# Patient Record
Sex: MALE | Race: WHITE | HISPANIC OR LATINO | URBAN - METROPOLITAN AREA
[De-identification: names, ages, dates, MRNs, and addresses within clinical notes are randomized per-mention and may not be internally consistent; named-entity substitution may affect disease eponyms.]

---

## 2020-07-29 ENCOUNTER — VIRTUAL VISIT (OUTPATIENT)
Dept: INTERNAL MEDICINE | Facility: CLINIC | Age: 35
End: 2020-07-29
Payer: COMMERCIAL

## 2020-07-29 DIAGNOSIS — R52 PAIN: Primary | ICD-10-CM

## 2020-07-29 DIAGNOSIS — Z13.220 LIPID SCREENING: ICD-10-CM

## 2020-07-29 RX ORDER — OMEPRAZOLE 10 MG/1
CAPSULE, DELAYED RELEASE ORAL
COMMUNITY
Start: 2020-04-24

## 2020-07-29 RX ORDER — IBUPROFEN 600 MG/1
TABLET, FILM COATED ORAL
COMMUNITY
Start: 2020-05-08

## 2020-07-29 ASSESSMENT — PAIN SCALES - GENERAL: PAINLEVEL: NO PAIN (0)

## 2020-07-29 NOTE — PROGRESS NOTES
Telephone visit    Mr. Benavides agrees to a telephone visit    H/o spinal cord injury 2012 L1 from falling from ladder. Care every note from 4/24/2020 regarding additional details. Overall stable. He has a bowel program and uses mini enemas every other day and this works for him. He has urinary catheters for neurogenic bladder and has not had UTI for more than 6 years. He has some minor skin back breakdown after an accident about 2 years ago. He tries to exercise. He sates Td within the last 10 years. He remains on Gabapentin 600 mg PO BID, rare Percocet #30 that last him 1-3 months. He takes a MVI. He takes PRN Ibuprofen and a PPI. No early family h/o prostate or colon cancer. He is due for basic lab tests. He would like to meet with PMR. No other HEENT, cardiopulmonary, abdominal, , neurological, systemic, psychiatric, lymphatic, endocrine, vascular complaints. Will set up PMR and try to see him on the same day. Ordered labs today    JACQUELINE Baird MD    Total time today 12 minutes and 56 seconds.

## 2020-07-29 NOTE — Clinical Note
(1) future labs  (2) future PMR referral  (3) If he comes in-person for PMR I would like to see him in-person on the same day. Otherwise I would like to see him in-person for one hour in the next 3 weeks

## 2020-08-19 ENCOUNTER — TELEPHONE (OUTPATIENT)
Dept: INTERNAL MEDICINE | Facility: CLINIC | Age: 35
End: 2020-08-19

## 2020-08-19 ENCOUNTER — OFFICE VISIT (OUTPATIENT)
Dept: INTERNAL MEDICINE | Facility: CLINIC | Age: 35
End: 2020-08-19
Payer: COMMERCIAL

## 2020-08-19 VITALS — DIASTOLIC BLOOD PRESSURE: 76 MMHG | OXYGEN SATURATION: 95 % | SYSTOLIC BLOOD PRESSURE: 130 MMHG | HEART RATE: 62 BPM

## 2020-08-19 DIAGNOSIS — Z13.220 LIPID SCREENING: ICD-10-CM

## 2020-08-19 DIAGNOSIS — R52 PAIN: ICD-10-CM

## 2020-08-19 LAB
ALBUMIN SERPL-MCNC: 4.1 G/DL (ref 3.4–5)
ALP SERPL-CCNC: 60 U/L (ref 40–150)
ALT SERPL W P-5'-P-CCNC: 21 U/L (ref 0–70)
ANION GAP SERPL CALCULATED.3IONS-SCNC: 5 MMOL/L (ref 3–14)
AST SERPL W P-5'-P-CCNC: 16 U/L (ref 0–45)
BASOPHILS # BLD AUTO: 0.1 10E9/L (ref 0–0.2)
BASOPHILS NFR BLD AUTO: 0.8 %
BILIRUB SERPL-MCNC: 0.4 MG/DL (ref 0.2–1.3)
BUN SERPL-MCNC: 20 MG/DL (ref 7–30)
CALCIUM SERPL-MCNC: 8.8 MG/DL (ref 8.5–10.1)
CHLORIDE SERPL-SCNC: 104 MMOL/L (ref 94–109)
CHOLEST SERPL-MCNC: 180 MG/DL
CO2 SERPL-SCNC: 30 MMOL/L (ref 20–32)
CREAT SERPL-MCNC: 0.87 MG/DL (ref 0.66–1.25)
DIFFERENTIAL METHOD BLD: NORMAL
EOSINOPHIL # BLD AUTO: 0.1 10E9/L (ref 0–0.7)
EOSINOPHIL NFR BLD AUTO: 1.8 %
ERYTHROCYTE [DISTWIDTH] IN BLOOD BY AUTOMATED COUNT: 12.1 % (ref 10–15)
GFR SERPL CREATININE-BSD FRML MDRD: >90 ML/MIN/{1.73_M2}
GLUCOSE SERPL-MCNC: 82 MG/DL (ref 70–99)
HCT VFR BLD AUTO: 42.4 % (ref 40–53)
HDLC SERPL-MCNC: 46 MG/DL
HGB BLD-MCNC: 14.5 G/DL (ref 13.3–17.7)
IMM GRANULOCYTES # BLD: 0 10E9/L (ref 0–0.4)
IMM GRANULOCYTES NFR BLD: 0.3 %
LDLC SERPL CALC-MCNC: 89 MG/DL
LYMPHOCYTES # BLD AUTO: 2.1 10E9/L (ref 0.8–5.3)
LYMPHOCYTES NFR BLD AUTO: 34.9 %
MCH RBC QN AUTO: 31.8 PG (ref 26.5–33)
MCHC RBC AUTO-ENTMCNC: 34.2 G/DL (ref 31.5–36.5)
MCV RBC AUTO: 93 FL (ref 78–100)
MONOCYTES # BLD AUTO: 0.5 10E9/L (ref 0–1.3)
MONOCYTES NFR BLD AUTO: 8.6 %
NEUTROPHILS # BLD AUTO: 3.3 10E9/L (ref 1.6–8.3)
NEUTROPHILS NFR BLD AUTO: 53.6 %
NONHDLC SERPL-MCNC: 134 MG/DL
NRBC # BLD AUTO: 0 10*3/UL
NRBC BLD AUTO-RTO: 0 /100
PLATELET # BLD AUTO: 358 10E9/L (ref 150–450)
POTASSIUM SERPL-SCNC: 3.8 MMOL/L (ref 3.4–5.3)
PROT SERPL-MCNC: 7.6 G/DL (ref 6.8–8.8)
RBC # BLD AUTO: 4.56 10E12/L (ref 4.4–5.9)
SODIUM SERPL-SCNC: 139 MMOL/L (ref 133–144)
TRIGL SERPL-MCNC: 226 MG/DL
WBC # BLD AUTO: 6.1 10E9/L (ref 4–11)

## 2020-08-19 RX ORDER — OXYCODONE AND ACETAMINOPHEN 5; 325 MG/1; MG/1
1 TABLET ORAL
Qty: 30 TABLET | Refills: 0 | Status: SHIPPED | OUTPATIENT
Start: 2020-08-19 | End: 2020-12-08

## 2020-08-19 ASSESSMENT — PAIN SCALES - GENERAL: PAINLEVEL: NO PAIN (0)

## 2020-08-19 NOTE — NURSING NOTE
Chief Complaint   Patient presents with     Recheck Medication     pt here tp follow up from a few weeks ago       Matt Hernandez CMA, EMT at 10:01 AM on 8/19/2020.

## 2020-08-19 NOTE — TELEPHONE ENCOUNTER
Prior Authorization Approval        Authorization Effective Date: 7/20/2020  Authorization Expiration Date: 8/19/2021  Medication: docusate sodium (ENEMEEZ) 283 MG enema-PA APPROVED   Approved Dose/Quantity:   Reference #: CASE [id number]   Insurance Company: [employer] - Phone [phone] Fax [phone]  Expected CoPay:       CoPay Card Available:      Foundation Assistance Needed:    Which Pharmacy is filling the prescription (Not needed for infusion/clinic administered): [facility] - [place], [place] - [address]  Pharmacy Notified: Yes- **Instructed pharmacy to notify patient when script is ready to /ship.**  Patient Notified: Yes

## 2020-08-19 NOTE — TELEPHONE ENCOUNTER
Central Prior Authorization Team   [phone]    PA Initiation    Medication: docusate sodium (ENEMEEZ) 283 MG enema   Insurance Company: [employer] - Phone [phone] Fax [phone]  Pharmacy Filling the Rx: [facility] - [place], [place] - [address]  Filling Pharmacy Phone: [phone]  Filling Pharmacy Fax: [phone]  Start Date: 8/19/2020

## 2020-08-19 NOTE — PROGRESS NOTES
"HPI:    See telephone note from 7/29/2020 regarding his medical history. H/o spinal cord injury 2012 L1 from falling from ladder. Care every note from 4/24/2020 regarding additional details. He states he was at his Grandmother's house to do some cleaning on her roof and the latter slipped out and he landed on the driveway on his back. He states spinal surgery at L1. He denies any urinary complaints and self-caths. He uses \"mini enemas\" for bowel program. He uses PRN Ibuprofen/Alleve. He takes Gabapentin and rare Percocet. He states he is up to date on Tdap. He does not feel that he needs to see dermatology. He does not smoke nor abuse EtOH. He remains active and continues to exercise. He uses braced walking canes. No other HEENT, cardiopulmonary, abdominal, , neurological, systemic, psychiatric, lymphatic, endocrine, vascular complaints.     PE:    Vitals noted, gen, nad, cooperative, alert, he is wearing a mask, neck supple nl rom, lungs with good air movement, RRR, S1, S2, no MRG, abdomen, no acute findings. He has some B LE calf decreased muscle mass. He is wearing B foot braces for foot drop.       A/P:      1. Spinal cord injury. He has PMR appt. With Dr. Sena 8/27/2020.   2. Ordered labs today and stable/normal. LDL 89 and HDL 46  3. Immunizations; Tdap he states within the last 10 years  4. Renewed mini-enemas today  5. Gave him a Rx. For #30 percocet today and he states this usually last him about 2 months.    I will see him back in 3-6 months.     Total time spent 25 minutes.  More than 50% of the time spent with Mr. Benavides on counseling / coordinating his care        "

## 2020-08-19 NOTE — PATIENT INSTRUCTIONS
[facility] Medication Refill Request Information:  * Please contact your pharmacy regarding ANY request for medication refills.  ** [facility] Fax = [phone]  * Please allow 3 business days for routine medication refills.  * Please allow 5 business days for controlled substance medication refills.     [facility] Test Result notification information:  *You will be notified with in 7-10 days of your appointment day regarding the results of your test.  If you are on MyChart you will be notified as soon as the provider has reviewed the results and signed off on them.    [facility]: [phone]

## 2020-08-27 ENCOUNTER — VIRTUAL VISIT (OUTPATIENT)
Dept: PHYSICAL MEDICINE AND REHAB | Facility: CLINIC | Age: 35
End: 2020-08-27
Attending: INTERNAL MEDICINE
Payer: COMMERCIAL

## 2020-08-27 DIAGNOSIS — S34.109S INJURY OF LUMBAR SPINAL CORD, SEQUELA (H): Primary | ICD-10-CM

## 2020-08-27 NOTE — LETTER
8/27/2020     RE: Nicholas Benavides  [address] [place] [id number]     Dear Colleague,    Thank you for referring your patient, Nicholas Benavides, to the [facility] PHYSICAL MEDICINE AND REHABILITATION at [facility]. Please see a copy of my visit note below.    No response to telephone call.  Voicemail left.  Will attempt to re-schedule, hopefully for in person evaluation.     Ray Sena MD  Department of Rehabilitation Medicine  Pager: [phone]      Again, thank you for allowing me to participate in the care of your patient.      Sincerely,    Sonny Sena MD

## 2020-08-27 NOTE — PROGRESS NOTES
No response to telephone call.  Voicemail left.  Will attempt to re-schedule, hopefully for in person evaluation.     Ray Sena MD  Department of Rehabilitation Medicine  Pager: [phone]

## 2020-09-10 ENCOUNTER — OFFICE VISIT (OUTPATIENT)
Dept: PHYSICAL MEDICINE AND REHAB | Facility: CLINIC | Age: 35
End: 2020-09-10
Payer: COMMERCIAL

## 2020-09-10 VITALS
SYSTOLIC BLOOD PRESSURE: 132 MMHG | RESPIRATION RATE: 16 BRPM | DIASTOLIC BLOOD PRESSURE: 88 MMHG | HEART RATE: 93 BPM | OXYGEN SATURATION: 97 %

## 2020-09-10 DIAGNOSIS — M62.569 ATROPHY OF CALF MUSCLES: ICD-10-CM

## 2020-09-10 DIAGNOSIS — K59.2 NEUROGENIC BOWEL: ICD-10-CM

## 2020-09-10 DIAGNOSIS — G82.20 PARAPLEGIA (H): ICD-10-CM

## 2020-09-10 DIAGNOSIS — S34.109A: Primary | ICD-10-CM

## 2020-09-10 DIAGNOSIS — N31.9 NEUROGENIC BLADDER: ICD-10-CM

## 2020-09-10 DIAGNOSIS — R26.2 IMPAIRED AMBULATION: ICD-10-CM

## 2020-09-10 ASSESSMENT — PAIN SCALES - GENERAL: PAINLEVEL: MILD PAIN (3)

## 2020-09-10 NOTE — PROGRESS NOTES
"[facility] PM&R Clinic - New Patient Note   Nicholas Benavides  [id number]  Date of Evaluation: 9/10/2020  Referring Physician: Los Baird MD  Reason for consult: Spinal cord injury  HPI:  Nicholas Benavides is a 35-year-old man who is referred to the rehabilitation clinic today for his functional needs due to a spinal cord injury.  He tells me he suffered his injury about 8 years ago when he fell off a ladder and landed on his tailbone.  Subsequent injuries included a burst fracture of the L1 which required surgical fusion of T12-L2 which was done at [facility].  He was then discharged to [facility] for inpatient rehabilitation and also followed up there as an outpatient for many years, however he tells me he \"did not have a good experience with them\".  He was graded as an L1 AIS C level of injury.  Nicholas's biggest complaint today is that his calf muscles are \"shrunken\".  He attributes this to the fact that he was put in plastic AFOs which limited his movement which caused the atrophy, and this is the biggest reason why he feels he did not have a good experience at [facility].  He thinks that they were working on core strengthening exercises only and they should have been working on lower extremity exercises as well.  He also says they were not accommodating on different types of bracing options, and he is currently using over the shelf bracing which she has modified on his own to help with support and a degree of mobility.  Functionally he uses bilateral canes for ambulation (are actually Loftstrand crutches which she has self modified to remove the forearm cuffs), and is independent with ADLs.  He does not need a wheelchair for mobility.  He lives in a handicap accessible apartment with modifications to the bathroom.  At home he has an exercise mat and abbie for which she does a home exercise program.  He uses a walker for balance when performing squat exercises.  He also purchased a fat tire tricycle " bike and feels that since he started riding this the muscle mass in his calves has improved.  He does have some pain in his lower back and sometimes legs which is aggravated by cold weather.  He typically goes to [place] to stay with his family during the winter times.    For bladder management he gets the urge to urinate at which point he will self cath.  For bowel management he uses a mini enema every other day, was previously using suppositories but feels the mini enemas worked much better.  He is mostly continent and has only very rare incontinent accidents.    PMH:  L1 AIS C spinal cord injur  PSH:  T12-L2 fusion    Allergies:  No Known Allergies    Medications:  Current Outpatient Medications   Medication     docusate sodium (ENEMEEZ) 283 MG enema     gabapentin (NEURONTIN) 600 MG tablet     ibuprofen (ADVIL/MOTRIN) 600 MG tablet     omeprazole (PRILOSEC) 10 MG DR capsule     oxyCODONE-acetaminophen (PERCOCET) 5-325 MG tablet     No current facility-administered medications for this visit.        Social History:  Social History     Socioeconomic History     Marital status: Single     Spouse name: Not on file     Number of children: Not on file     Years of education: Not on file     Highest education level: Not on file   Occupational History     Not on file   Social Needs     Financial resource strain: Not on file     Food insecurity     Worry: Not on file     Inability: Not on file     Transportation needs     Medical: Not on file     Non-medical: Not on file   Tobacco Use     Smoking status: Former Smoker     Smokeless tobacco: Never Used   Substance and Sexual Activity     Alcohol use: Not on file     Drug use: Not on file     Sexual activity: Not on file   Lifestyle     Physical activity     Days per week: Not on file     Minutes per session: Not on file     Stress: Not on file   Relationships     Social connections     Talks on phone: Not on file     Gets together: Not on file     Attends Latter-day  service: Not on file     Active member of club or organization: Not on file     Attends meetings of clubs or organizations: Not on file     Relationship status: Not on file     Intimate partner violence     Fear of current or ex partner: Not on file     Emotionally abused: Not on file     Physically abused: Not on file     Forced sexual activity: Not on file   Other Topics Concern     Not on file   Social History Narrative     Not on file     Review of Systems - History obtained from the patient  General ROS: negative for - chills or fever  Respiratory ROS: no cough, shortness of breath, or wheezing  Cardiovascular ROS: no chest pain or dyspnea on exertion  Gastrointestinal ROS: positive for - Neurogenic bowel  Genito-Urinary ROS: positive for - Neurogenic bladder  Musculoskeletal ROS: positive for - Lower back pain, LE weakness  Neurological ROS: positive for - SCI  Dermatological ROS: negative for rash or ulcers    Functional Hx:  As per HPI    Physical Exam:  /88   Pulse 93   Resp 16   SpO2 97%   Gen: NAD, pleasant and cooperative  Skin: No rashes noted  HEENT: NC/AT  Pulm: Non-labored breathing  GI: Soft, NT/ND  Ext: +Calf atrophy, no tenderness to palpation  Neuro: AAOx3, follows commands, answers appropriately  MMT 5/5 to b/l UEs  MMT RLE 72020  MMR LLE 29782  Reflexes 2+ to patellas  0 to Achilles  Sensation very limited to L foot  Gait: L foot slap,  B/l walking sticks, +Trendelenburg with hip thrust to the left    Assessment:  Nicholas Benavides is a 35-year-old man with a L1 AIS C spinal cord injury from a fall off a ladder in ~2012.  He is ambulatory with b/l walking sticks, but does have ongoing deficits in strength and sensation.     Recommendations:  -Overall Nicholas is managing his spinal cord injury well, and he is very innovative in terms of modifying equipment and using what works best for him.  -I discussed his calf atrophy and what exactly the calf muscles (gastrocnemius and soleus) muscles  do.  His RLE does have some muscle activity and I demonstrated some simple activities he can do to try and build strength.  Unfortunately his L plantarflexors have no movement which would make building muscle more difficult as he is unable to perform any active strengthening.  However, I discussed passive ROM will still be beneficial for maintaining muscle bulk, and the use of a TENS machine over the gastroc muscle belly may help maintain or build muscle bulk as well.    -I also showed him some exercises to help with his gluteus medius muscles as he does have a Trendenlenburg gait.    -Unfortunately with his deficits and spinal cord injury, there is no medication or intervention that can quickly or suddenly improve his strength and muscle bulk.  I encouraged him to continue being diligent with a home exercise strengthening program.   -May follow up with me on an as needed basis only    Ray Sena MD  Department of Rehabilitation Medicine  Pager: [phone]    Time Spent on this Encounter   I, Ray Sena, spent a total of 45 minutes face-to-face or managing the care of Nicholas Benavides. Over 50% of my time was spent counseling the patient and coordinating care. See note for details.

## 2020-09-10 NOTE — LETTER
"9/10/2020       RE: Nicholas Benavides  [address] [place] [id number]     Dear Colleague,    Thank you for referring your patient, Nicholas Benavides, to the [facility] PHYSICAL MEDICINE AND REHABILITATION at [facility]. Please see a copy of my visit note below.    [facility] PM&R Clinic - New Patient Note   Nicholas Benavides  [id number]  Date of Evaluation: 9/10/2020  Referring Physician: Los Baird MD  Reason for consult: Spinal cord injury  HPI:  Nicholas Benavides is a 35-year-old man who is referred to the rehabilitation clinic today for his functional needs due to a spinal cord injury.  He tells me he suffered his injury about 8 years ago when he fell off a ladder and landed on his tailbone.  Subsequent injuries included a burst fracture of the L1 which required surgical fusion of T12-L2 which was done at [facility].  He was then discharged to [facility] for inpatient rehabilitation and also followed up there as an outpatient for many years, however he tells me he \"did not have a good experience with them\".  He was graded as an L1 AIS C level of injury.  Nicholas's biggest complaint today is that his calf muscles are \"shrunken\".  He attributes this to the fact that he was put in plastic AFOs which limited his movement which caused the atrophy, and this is the biggest reason why he feels he did not have a good experience at [facility].  He thinks that they were working on core strengthening exercises only and they should have been working on lower extremity exercises as well.  He also says they were not accommodating on different types of bracing options, and he is currently using over the shelf bracing which she has modified on his own to help with support and a degree of mobility.  Functionally he uses bilateral canes for ambulation (are actually Loftstrand crutches which she has self modified to remove the forearm cuffs), and is independent with ADLs.  He " does not need a wheelchair for mobility.  He lives in a handicap accessible apartment with modifications to the bathroom.  At home he has an exercise mat and abbie for which she does a home exercise program.  He uses a walker for balance when performing squat exercises.  He also purchased a fat tire tricycle bike and feels that since he started riding this the muscle mass in his calves has improved.  He does have some pain in his lower back and sometimes legs which is aggravated by cold weather.  He typically goes to [place] to stay with his family during the winter times.    For bladder management he gets the urge to urinate at which point he will self cath.  For bowel management he uses a mini enema every other day, was previously using suppositories but feels the mini enemas worked much better.  He is mostly continent and has only very rare incontinent accidents.    PMH:  L1 AIS C spinal cord injur  PSH:  T12-L2 fusion    Allergies:  No Known Allergies    Medications:  Current Outpatient Medications   Medication     docusate sodium (ENEMEEZ) 283 MG enema     gabapentin (NEURONTIN) 600 MG tablet     ibuprofen (ADVIL/MOTRIN) 600 MG tablet     omeprazole (PRILOSEC) 10 MG DR capsule     oxyCODONE-acetaminophen (PERCOCET) 5-325 MG tablet     No current facility-administered medications for this visit.        Social History:  Social History     Socioeconomic History     Marital status: Single     Spouse name: Not on file     Number of children: Not on file     Years of education: Not on file     Highest education level: Not on file   Occupational History     Not on file   Social Needs     Financial resource strain: Not on file     Food insecurity     Worry: Not on file     Inability: Not on file     Transportation needs     Medical: Not on file     Non-medical: Not on file   Tobacco Use     Smoking status: Former Smoker     Smokeless tobacco: Never Used   Substance and Sexual Activity     Alcohol use: Not on file      Drug use: Not on file     Sexual activity: Not on file   Lifestyle     Physical activity     Days per week: Not on file     Minutes per session: Not on file     Stress: Not on file   Relationships     Social connections     Talks on phone: Not on file     Gets together: Not on file     Attends Scientologist service: Not on file     Active member of club or organization: Not on file     Attends meetings of clubs or organizations: Not on file     Relationship status: Not on file     Intimate partner violence     Fear of current or ex partner: Not on file     Emotionally abused: Not on file     Physically abused: Not on file     Forced sexual activity: Not on file   Other Topics Concern     Not on file   Social History Narrative     Not on file     Review of Systems - History obtained from the patient  General ROS: negative for - chills or fever  Respiratory ROS: no cough, shortness of breath, or wheezing  Cardiovascular ROS: no chest pain or dyspnea on exertion  Gastrointestinal ROS: positive for - Neurogenic bowel  Genito-Urinary ROS: positive for - Neurogenic bladder  Musculoskeletal ROS: positive for - Lower back pain, LE weakness  Neurological ROS: positive for - SCI  Dermatological ROS: negative for rash or ulcers    Functional Hx:  As per HPI    Physical Exam:  /88   Pulse 93   Resp 16   SpO2 97%   Gen: NAD, pleasant and cooperative  Skin: No rashes noted  HEENT: NC/AT  Pulm: Non-labored breathing  GI: Soft, NT/ND  Ext: +Calf atrophy, no tenderness to palpation  Neuro: AAOx3, follows commands, answers appropriately  MMT 5/5 to b/l UEs  MMT RLE 69289  MMR LLE 29178  Reflexes 2+ to patellas  0 to Achilles  Sensation very limited to L foot  Gait: L foot slap,  B/l walking sticks, +Trendelenburg with hip thrust to the left    Assessment:  Nicholas Benavides is a 35-year-old man with a L1 AIS C spinal cord injury from a fall off a ladder in ~2012.  He is ambulatory with b/l walking sticks, but does have ongoing  deficits in strength and sensation.     Recommendations:  -Overall Nicholas is managing his spinal cord injury well, and he is very innovative in terms of modifying equipment and using what works best for him.  -I discussed his calf atrophy and what exactly the calf muscles (gastrocnemius and soleus) muscles do.  His RLE does have some muscle activity and I demonstrated some simple activities he can do to try and build strength.  Unfortunately his L plantarflexors have no movement which would make building muscle more difficult as he is unable to perform any active strengthening.  However, I discussed passive ROM will still be beneficial for maintaining muscle bulk, and the use of a TENS machine over the gastroc muscle belly may help maintain or build muscle bulk as well.    -I also showed him some exercises to help with his gluteus medius muscles as he does have a Trendenlenburg gait.    -Unfortunately with his deficits and spinal cord injury, there is no medication or intervention that can quickly or suddenly improve his strength and muscle bulk.  I encouraged him to continue being diligent with a home exercise strengthening program.   -May follow up with me on an as needed basis only    Ray Sena MD  Department of Rehabilitation Medicine  Pager: [phone]    Time Spent on this Encounter   I, Ray Sena, spent a total of 45 minutes face-to-face or managing the care of Nicholas Benavides. Over 50% of my time was spent counseling the patient and coordinating care. See note for details.       Again, thank you for allowing me to participate in the care of your patient.      Sincerely,    Sonny Sena MD

## 2020-09-10 NOTE — LETTER
"9/10/2020       RE: Nicholas Benavieds  [address] [place] [address]     Dear Colleague,    Thank you for referring your patient, Nicholas Benavides, to the [facility] PHYSICAL MEDICINE AND REHABILITATION at [facility]. Please see a copy of my visit note below.    [facility] PM[facility] - New Patient Note   Nicholas Benavides  [id number]  Date of Evaluation: 9/10/2020  Referring Physician: Los Baird MD  Reason for consult: Spinal cord injury  HPI:  Nicholas Benavides is a 35-year-old man who is referred to the rehabilitation clinic today for his functional needs due to a spinal cord injury.  He tells me he suffered his injury about 8 years ago when he fell off a ladder and landed on his tailbone.  Subsequent injuries included a burst fracture of the L1 which required surgical fusion of T12-L2 which was done at [facility].  He was then discharged to [facility] for inpatient rehabilitation and also followed up there as an outpatient for many years, however he tells me he \"did not have a good experience with them\".  He was graded as an L1 AIS C level of injury.  Nicholas's biggest complaint today is that his calf muscles are \"shrunken\".  He attributes this to the fact that he was put in plastic AFOs which limited his movement which caused the atrophy, and this is the biggest reason why he feels he did not have a good experience at [facility].  He thinks that they were working on core strengthening exercises only and they should have been working on lower extremity exercises as well.  He also says they were not accommodating on different types of bracing options, and he is currently using over the shelf bracing which she has modified on his own to help with support and a degree of mobility.  Functionally he uses bilateral canes for ambulation (are actually [place]ftstrand crutches which she has self modified to remove the forearm cuffs), and is independent with ADLs.  He " does not need a wheelchair for mobility.  He lives in a handicap accessible apartment with modifications to the bathroom.  At home he has an exercise mat and abbie for which she does a home exercise program.  He uses a walker for balance when performing squat exercises.  He also purchased a fat tire tricycle bike and feels that since he started riding this the muscle mass in his calves has improved.  He does have some pain in his lower back and sometimes legs which is aggravated by cold weather.  He typically goes to [place] to stay with his family during the winter times.    For bladder management he gets the urge to urinate at which point he will self cath.  For bowel management he uses a mini enema every other day, was previously using suppositories but feels the mini enemas worked much better.  He is mostly continent and has only very rare incontinent accidents.    PMH:  L1 AIS C spinal cord injur  PSH:  T12-L2 fusion    Allergies:  No Known Allergies    Medications:  Current Outpatient Medications   Medication     docusate sodium (ENEMEEZ) 283 MG enema     gabapentin (NEURONTIN) 600 MG tablet     ibuprofen (ADVIL/MOTRIN) 600 MG tablet     omeprazole (PRILOSEC) 10 MG DR capsule     oxyCODONE-acetaminophen (PERCOCET) 5-325 MG tablet     No current facility-administered medications for this visit.      Social History:  Social History     Socioeconomic History     Marital status: Single     Spouse name: Not on file     Number of children: Not on file     Years of education: Not on file     Highest education level: Not on file   Occupational History     Not on file   Social Needs     Financial resource strain: Not on file     Food insecurity     Worry: Not on file     Inability: Not on file     Transportation needs     Medical: Not on file     Non-medical: Not on file   Tobacco Use     Smoking status: Former Smoker     Smokeless tobacco: Never Used   Substance and Sexual Activity     Alcohol use: Not on file      Drug use: Not on file     Sexual activity: Not on file   Lifestyle     Physical activity     Days per week: Not on file     Minutes per session: Not on file     Stress: Not on file   Relationships     Social connections     Talks on phone: Not on file     Gets together: Not on file     Attends Caodaism service: Not on file     Active member of club or organization: Not on file     Attends meetings of clubs or organizations: Not on file     Relationship status: Not on file     Intimate partner violence     Fear of current or ex partner: Not on file     Emotionally abused: Not on file     Physically abused: Not on file     Forced sexual activity: Not on file   Other Topics Concern     Not on file   Social History Narrative     Not on file     Review of Systems - History obtained from the patient  General ROS: negative for - chills or fever  Respiratory ROS: no cough, shortness of breath, or wheezing  Cardiovascular ROS: no chest pain or dyspnea on exertion  Gastrointestinal ROS: positive for - Neurogenic bowel  Genito-Urinary ROS: positive for - Neurogenic bladder  Musculoskeletal ROS: positive for - Lower back pain, LE weakness  Neurological ROS: positive for - SCI  Dermatological ROS: negative for rash or ulcers    Functional Hx:  As per HPI    Physical Exam:  /88   Pulse 93   Resp 16   SpO2 97%   Gen: NAD, pleasant and cooperative  Skin: No rashes noted  HEENT: NC/AT  Pulm: Non-labored breathing  GI: Soft, NT/ND  Ext: +Calf atrophy, no tenderness to palpation  Neuro: AAOx3, follows commands, answers appropriately  MMT 5/5 to b/l UEs  MMT RLE 30133  MMR LLE [address]109  Reflexes 2+ to patellas  0 to Achilles  Sensation very limited to L foot  Gait: L foot slap,  B/l walking sticks, +Trendelenburg with hip thrust to the left    Assessment:  Nicholas Benavides is a 35-year-old man with a L1 AIS C spinal cord injury from a fall off a ladder in ~2012.  He is ambulatory with b/l walking sticks, but does have ongoing  deficits in strength and sensation.     Recommendations:  -Overall Nicholas is managing his spinal cord injury well, and he is very innovative in terms of modifying equipment and using what works best for him.  -I discussed his calf atrophy and what exactly the calf muscles (gastrocnemius and soleus) muscles do.  His RLE does have some muscle activity and I demonstrated some simple activities he can do to try and build strength.  Unfortunately his L plantarflexors have no movement which would make building muscle more difficult as he is unable to perform any active strengthening.  However, I discussed passive ROM will still be beneficial for maintaining muscle bulk, and the use of a TENS machine over the gastroc muscle belly may help maintain or build muscle bulk as well.    -I also showed him some exercises to help with his gluteus medius muscles as he does have a Tren[place]lenburg gait.    -Unfortunately with his deficits and spinal cord injury, there is no medication or intervention that can quickly or suddenly improve his strength and muscle bulk.  I encouraged him to continue being diligent with a home exercise strengthening program.   -May follow up with me on an as needed basis only    Time Spent on this Encounter   I, Ray Sena, spent a total of 45 minutes face-to-face or managing the care of Nicholas Benavides. Over 50% of my time was spent counseling the patient and coordinating care. See note for details.       Again, thank you for allowing me to participate in the care of your patient.  Sincerely,    Ray Sena MD  Department of Rehabilitation Medicine  Pager: [phone]

## 2020-12-16 ENCOUNTER — VIRTUAL VISIT (OUTPATIENT)
Dept: INTERNAL MEDICINE | Facility: CLINIC | Age: 35
End: 2020-12-16
Payer: COMMERCIAL

## 2020-12-16 DIAGNOSIS — R52 PAIN: Primary | ICD-10-CM

## 2020-12-16 PROCEDURE — 99212 OFFICE O/P EST SF 10 MIN: CPT | Mod: 95 | Performed by: INTERNAL MEDICINE

## 2020-12-16 NOTE — PATIENT INSTRUCTIONS
[facility] Medication Refill Request Information:  * Please contact your pharmacy regarding ANY request for medication refills.  ** [facility] Prescription Fax = [phone]  * Please allow 3 business days for routine medication refills.  * Please allow 5 business days for controlled substance medication refills.     [facility] Test Result notification information:  *You will be notified with in 7-10 days of your appointment day regarding the results of your test.  If you are on MyChart you will be notified as soon as the provider has reviewed the results and signed off on them.    [facility]: [phone]

## 2020-12-16 NOTE — NURSING NOTE
Chief Complaint   Patient presents with     Medication Follow-up     Pt is following up on medications.      Video Visit Technology for this patient: No video technology available to patient, please call patient over the phone     Racheal James LPN at 11:43 AM on 12/16/2020.

## 2020-12-16 NOTE — PROGRESS NOTES
Telephone visit    Mr. Benavides agrees to a telephone visit     Pt. Had 9/10/2020 PMR appt. With Dr. Sena regarding a spinal cord injury.     Initial visit with me was 8/19/2020 to establish care and additional details are in that note.     Laboratory tests done 8/19/2020 including fasting lipids were unremarkable.     He states he needs documentation that he needs his two cats as emotional support for flying. He is going to spend the winter with his mother in [place], [place]. He will come in this week to fill out these forms.    He states he would like a refill on his pain medication and we can also do this    JACQUELINE Baird MD    Total time today 6 minutes and 12 seconds

## 2020-12-18 DIAGNOSIS — R52 PAIN: ICD-10-CM

## 2020-12-18 RX ORDER — OXYCODONE AND ACETAMINOPHEN 5; 325 MG/1; MG/1
1 TABLET ORAL
Qty: 30 TABLET | Refills: 0 | Status: SHIPPED | OUTPATIENT
Start: 2020-12-18 | End: 2021-06-16

## 2021-01-15 ENCOUNTER — HEALTH MAINTENANCE LETTER (OUTPATIENT)
Age: 36
End: 2021-01-15

## 2021-01-27 DIAGNOSIS — R52 PAIN: ICD-10-CM

## 2021-01-27 NOTE — TELEPHONE ENCOUNTER
[facility] Call Center    Phone Message    May a detailed message be left on voicemail: yes     Reason for Call: Medication Question or concern regarding medication   Prescription Clarification  Name of Medication:   * gabapentin (NEURONTIN) 600 MG tablet    Prescribing Provider: Brie     Pharmacy: [facility], [address] [place] [address] Phone:  [phone]      What on the order needs clarification? Per Patient is out of town and needing a refill and wondering if able to send to the pharmacy listed above, please advise.       Action Taken: Message routed to:  [facility]    Travel Screening: Not Applicable

## 2021-01-29 RX ORDER — GABAPENTIN 600 MG/1
600 TABLET ORAL 2 TIMES DAILY
Qty: 180 TABLET | Refills: 1 | Status: SHIPPED | OUTPATIENT
Start: 2021-01-29 | End: 2021-07-06

## 2021-03-03 DIAGNOSIS — R52 PAIN: ICD-10-CM

## 2021-03-03 NOTE — TELEPHONE ENCOUNTER
[facility] Call [facility]    Phone Message    May a detailed message be left on voicemail: yes     Reason for Call: Medication Refill Request    Has the patient contacted the pharmacy for the refill? Yes   Name of medication being requested: docusate sodium (ENEMEEZ) 283 MG enema  Provider who prescribed the medication: Dr. Baird  Pharmacy:    [id number] [place] - [address] [facility]    Date medication is needed: Patient has 3 pills left. Please call patient if there's any questions.          Action Taken: Message routed to:  [facility]    Travel Screening: Not Applicable

## 2021-06-16 ENCOUNTER — VIRTUAL VISIT (OUTPATIENT)
Dept: INTERNAL MEDICINE | Facility: CLINIC | Age: 36
End: 2021-06-16
Payer: MEDICARE

## 2021-06-16 DIAGNOSIS — N52.9 MALE ERECTILE DISORDER: Primary | ICD-10-CM

## 2021-06-16 DIAGNOSIS — R52 PAIN: ICD-10-CM

## 2021-06-16 PROCEDURE — 99441 PR PHYSICIAN TELEPHONE EVALUATION 5-10 MIN: CPT | Mod: 95 | Performed by: INTERNAL MEDICINE

## 2021-06-16 RX ORDER — OXYCODONE AND ACETAMINOPHEN 5; 325 MG/1; MG/1
1 TABLET ORAL
Qty: 30 TABLET | Refills: 0 | Status: SHIPPED | OUTPATIENT
Start: 2021-06-16 | End: 2021-08-04

## 2021-06-16 NOTE — PROGRESS NOTES
Telephone visit    Mr. Benavides agrees to a telephone visit    Last telephone visit with me 12/16/2020. He has h/o spinal cord injury falling off a ladder about 8 years ago. See detailed PMR note from Dr. Sena 9/10/2020. Also in-person visit with me 8/19/2020 with additional details.     Recommend COVID vaccination and he got the Pfizer, COVID vaccination series     He  Uses rare Percocet and refilled today 6/16/2021    Erectile dysfunction and he has spinal cord injury and will refer to Dr. Alvarez and placed referral today    No other medical/neurological issues    JACQUELINE Baird    Total time today 5 minutes

## 2021-06-16 NOTE — NURSING NOTE
Chief Complaint   Patient presents with     Sexual Problem     concerned about sexual performance, wondering if there is a medication to help     Medication Request     oxycodone     Forms     handicap parking form       Dipti Gao, EMT at 10:37 AM on 6/16/2021

## 2021-06-21 ENCOUNTER — PRE VISIT (OUTPATIENT)
Dept: UROLOGY | Facility: CLINIC | Age: 36
End: 2021-06-21

## 2021-06-21 ENCOUNTER — TELEPHONE (OUTPATIENT)
Dept: INTERNAL MEDICINE | Facility: CLINIC | Age: 36
End: 2021-06-21

## 2021-06-22 NOTE — TELEPHONE ENCOUNTER
Central Prior Authorization Team   Phone: [phone]      PA Initiation    Medication: sildenafil (REVATIO) 20 MG tablet  Insurance Company: [employer] - Phone [phone] Fax [phone]  Pharmacy Filling the Rx: [facility] - [place] - [address]  Filling Pharmacy Phone: [phone]  Filling Pharmacy Fax:    Start Date: 6/22/2021

## 2021-06-23 NOTE — TELEPHONE ENCOUNTER
PRIOR AUTHORIZATION DENIED    Medication: sildenafil (REVATIO) 20 MG tablet-DENIED    Denial Date: 6/23/2021    Denial Rational:           Appeal Information: Cannot appeal due to Prior Auth not available

## 2021-06-23 NOTE — TELEPHONE ENCOUNTER
PRIOR AUTHORIZATION DENIED    Medication: sildenafil (REVATIO) 20 MG tablet-DENIED    Denial Date: 6/23/2021    Denial Rational:           Appeal Information:

## 2021-07-02 ENCOUNTER — MYC MEDICAL ADVICE (OUTPATIENT)
Dept: INTERNAL MEDICINE | Facility: CLINIC | Age: 36
End: 2021-07-02

## 2021-07-02 DIAGNOSIS — R52 PAIN: ICD-10-CM

## 2021-07-06 RX ORDER — GABAPENTIN 600 MG/1
600 TABLET ORAL 2 TIMES DAILY
Qty: 180 TABLET | Refills: 1 | Status: SHIPPED | OUTPATIENT
Start: 2021-07-06 | End: 2022-01-31

## 2021-07-15 DIAGNOSIS — R52 PAIN: ICD-10-CM

## 2021-07-19 RX ORDER — GABAPENTIN 600 MG/1
TABLET ORAL
Qty: 180 TABLET | Refills: 1 | OUTPATIENT
Start: 2021-07-19

## 2021-08-03 DIAGNOSIS — R52 PAIN: ICD-10-CM

## 2021-08-03 NOTE — TELEPHONE ENCOUNTER
[facility] Call [facility]    Phone Message    May a detailed message be left on voicemail: yes     Reason for Call: Medication Refill Request    Has the patient contacted the pharmacy for the refill? Yes   Name of medication being requested: oxyCODONE-acetaminophen (PERCOCET) 5-325 MG tablet  Provider who prescribed the medication: Dr Baird  Pharmacy:    [facility] [address]  Date medication is needed: ASAP         Action Taken: Message routed to:  [facility]    Travel Screening: Not Applicable

## 2021-08-04 ENCOUNTER — TELEPHONE (OUTPATIENT)
Dept: INTERNAL MEDICINE | Facility: CLINIC | Age: 36
End: 2021-08-04

## 2021-08-04 RX ORDER — OXYCODONE AND ACETAMINOPHEN 5; 325 MG/1; MG/1
1 TABLET ORAL
Qty: 30 TABLET | Refills: 0 | Status: SHIPPED | OUTPATIENT
Start: 2021-08-04 | End: 2021-09-09

## 2021-08-04 NOTE — TELEPHONE ENCOUNTER
Patient Requested  oxyCODONE-acetaminophen (PERCOCET) 5-325 MG tablet  Last Filled  06/16/2021  Last Office Visit  08/19/2020  Next Office Visit     Checked  08/04/2021    DX:     Pharmacy:     YOSEF BENDER CMA at 7:55 AM on 8/4/2021.

## 2021-08-04 NOTE — TELEPHONE ENCOUNTER
[facility] Call [facility]    Phone Message    May a detailed message be left on voicemail: yes     Reason for Call: Form or Letter   Type or form/letter needing completion: from [facility]- Medical recommendation form   Provider: Brie Young form needed: ASAP  Once completed: Fax form to: To [facility] [employer].     Paperwork was filled out by patient today and is being sent to clinic. Patient wanted to give an FYI.     Action Taken: Message routed to:  [facility]    Travel Screening: Not Applicable

## 2021-08-05 NOTE — CONFIDENTIAL NOTE
I will watch for paperwork and give to Dr. Baird when it is here,    Lisette Box on 8/5/2021 at 11:34 AM

## 2021-08-27 ENCOUNTER — TELEPHONE (OUTPATIENT)
Dept: INTERNAL MEDICINE | Facility: CLINIC | Age: 36
End: 2021-08-27

## 2021-08-27 DIAGNOSIS — R52 PAIN: ICD-10-CM

## 2021-08-27 NOTE — TELEPHONE ENCOUNTER
[facility] Call [facility]    Phone Message    May a detailed message be left on voicemail: yes     Reason for Call: Medication Refill Request    Has the patient contacted the pharmacy for the refill? Yes   Name of medication being requested: docusate sodium (ENEMEEZ) 283 MG enema     Provider who prescribed the medication: Dr. Baird  Pharmacy: [facility]  Date medication is needed: 8/27/21         Action Taken: Message routed to:  [facility] med refills

## 2021-08-27 NOTE — TELEPHONE ENCOUNTER
PA Initiation    Medication: docusate sodium (ENEMEEZ) 283 MG enema   Insurance Company: [employer] - Phone [phone] Fax [phone]  Pharmacy Filling the Rx: [facility] - [place] - [address]  Filling Pharmacy Phone: [phone]  Filling Pharmacy Fax: [phone]  Start Date: 8/27/2021

## 2021-08-27 NOTE — TELEPHONE ENCOUNTER
Prior Authorization Retail Medication Request    Medication/Dose: docusate sodium (ENEMEEZ) 283 MG enema  ICD code (if different than what is on RX):  Pain [R52]   Previously Tried and Failed:    Rationale:      Insurance Name:  RADHA MICHEL  Insurance ID:    [id number]    Pharmacy Information (if different than what is on RX)  Name:  [facility] - [place] [place] - [address]  Phone:  [phone]

## 2021-08-27 NOTE — TELEPHONE ENCOUNTER
Prior Authorization Approval    Authorization Effective Date: 7/28/2021  Authorization Expiration Date: 8/27/2022  Medication: docusate sodium (ENEMEEZ) 283 MG enema--APPROVED  Approved Dose/Quantity:   Reference #:     Insurance Company: [employer] - Phone [phone] Fax [phone]  Expected CoPay:       CoPay Card Available:      Foundation Assistance Needed:    Which Pharmacy is filling the prescription (Not needed for infusion/clinic administered): [facility] - [place], [place] - [address]  Pharmacy Notified: Yes  Patient Notified: Yes **Instructed pharmacy to notify patient when script is ready to /ship.**

## 2021-08-31 ENCOUNTER — PRE VISIT (OUTPATIENT)
Dept: UROLOGY | Facility: CLINIC | Age: 36
End: 2021-08-31

## 2021-08-31 NOTE — TELEPHONE ENCOUNTER
Reason for visit: Consult     Relevant information: erectile dysfunction    Records/imaging/labs/orders: in EPIC    Pt called: no    At Rooming: normal

## 2021-09-04 ENCOUNTER — HEALTH MAINTENANCE LETTER (OUTPATIENT)
Age: 36
End: 2021-09-04

## 2021-09-08 DIAGNOSIS — R52 PAIN: ICD-10-CM

## 2021-09-08 NOTE — TELEPHONE ENCOUNTER
[facility] Call [facility]    Phone Message    May a detailed message be left on voicemail: yes     Reason for Call: Medication Refill Request    Has the patient contacted the pharmacy for the refill? Yes   Name of medication being requested: oxyCODONE-acetaminophen (PERCOCET) 5-325 MG tablet  Provider who prescribed the medication: Dr Baird  Pharmacy:    [facility] [address]      Date medication is needed: ASAP   Pt was told of the 5-7 turnaround time for this medication.      Action Taken: Message routed to:  [facility]    Travel Screening: Not Applicable

## 2021-09-09 RX ORDER — OXYCODONE AND ACETAMINOPHEN 5; 325 MG/1; MG/1
1 TABLET ORAL
Qty: 30 TABLET | Refills: 0 | Status: SHIPPED | OUTPATIENT
Start: 2021-09-09 | End: 2021-10-19

## 2021-09-09 NOTE — TELEPHONE ENCOUNTER
Patient Requested  oxyCODONE-acetaminophen (PERCOCET) 5-325 MG tablet  Last Filled  08/04/2021  Last Office Visit  08/19/2020  Next Office Visit     Checked  09/09/2021    DX:     Pharmacy:     YOSEF Cabrera RN at 2:22 PM on 9/9/2021.

## 2021-09-16 ENCOUNTER — VIRTUAL VISIT (OUTPATIENT)
Dept: UROLOGY | Facility: CLINIC | Age: 36
End: 2021-09-16
Attending: INTERNAL MEDICINE
Payer: MEDICARE

## 2021-09-16 VITALS — BODY MASS INDEX: 24.38 KG/M2 | WEIGHT: 180 LBS | HEIGHT: 72 IN

## 2021-09-16 DIAGNOSIS — N52.9 MALE ERECTILE DISORDER: ICD-10-CM

## 2021-09-16 PROCEDURE — 99204 OFFICE O/P NEW MOD 45 MIN: CPT | Mod: 95 | Performed by: UROLOGY

## 2021-09-16 ASSESSMENT — MIFFLIN-ST. JEOR: SCORE: 1774.47

## 2021-09-16 ASSESSMENT — PAIN SCALES - GENERAL: PAINLEVEL: MILD PAIN (3)

## 2021-09-16 NOTE — PATIENT INSTRUCTIONS
Please get a blood draw at any [facility] lab. Dr. Alvarez will contact you via [employer] with test results.    It was a pleasure meeting with you today.  Thank you for allowing me and my team the privilege of caring for you today.  YOU are the reason we are here, and I truly hope we provided you with the excellent service you deserve.  Please let us know if there is anything else we can do for you so that we can be sure you are leaving completely satisfied with your care experience.

## 2021-09-16 NOTE — PROGRESS NOTES
Nicholas is a 36 year old who is being evaluated via a billable video visit.      Video Visit Technology for this patient: Brandy Video Visit- Patient was left in waiting room    How would you like to obtain your AVS? MyChart  If the video visit is dropped, the invitation should be resent by: Send to e-mail at: [email or web address]  Will anyone else be joining your video visit? No      Video Start Time: 1:29 PM  Video-Visit Details    Type of service:  Video Visit    Video End Time:1:40 PM    Originating Location (pt. Location): Home    Distant Location (provider location):  [facility] [place]     Platform used for Video Visit: Thrive Solo    I am seeing Nicholas Benavides in consultation from Los Baird for evaluation of erectile dysfunction.    HPI:  Nicholas Benavides is a 36 year old male is a very nice man with complaints of erectile dysfunction for the last.  He has history of incomplete SCI and cauda equina syndrome.  His injury was 9 years ago.  Nerve symptoms are roughly the same x 9 years.  First year could not get erection.  After that was getting frequent erections, and this has settled down.    Sensation level is some numbness on the penis.    Symptoms now are   Can get an erection with masturbation.  Hard to reach orgasm.  Premature ejaculation sometimes.  Sometimes can't get the erection.    Just started trying sildenafil, 40mg.  This has been hit or miss.    ED/Vascular disease risk factors:  HTN:   No  Hyperlipidemia: no  Smoking: no   DM: no  Cardiovascular disease: None known  Meds associated with ED that he's taking: longstanding percocet, gabapentin.  Penile Plaques or curvature:  none.     REVIEW OF SYSTEMS:  General: negative  Skin: negative  Eyes: negative  Ears/Nose/Throat: negative  Respiratory: negative  Cardiovascular: negative  Gastrointestinal: negative  Genitourinary: see HPI  Musculoskeletal: negative  Neurologic: negative  Psychiatric:  negative  Hematologic/Lymphatic/Immunologic: negative  Endocrine: negative    PAST MEDICAL HX:  Spinal cord injury     PAST SURG HX:  Spinal fusion  Left wrist fracture.    SOCIAL HX:  Social History     Tobacco Use     Smoking status: Former Smoker     Smokeless tobacco: Never Used   Substance Use Topics     Alcohol use: None     Drug use: None       MEDICATIONS:  Current Outpatient Medications   Medication Sig     docusate sodium (ENEMEEZ) 283 MG enema Place 1 enema rectally daily     gabapentin (NEURONTIN) 600 MG tablet Take 1 tablet (600 mg) by mouth 2 times daily     ibuprofen (ADVIL/MOTRIN) 600 MG tablet      oxyCODONE-acetaminophen (PERCOCET) 5-325 MG tablet Take 1 tablet by mouth nightly as needed for severe pain     sildenafil (REVATIO) 20 MG tablet Take 1 tablet (20 mg) by mouth daily as needed (ed)     omeprazole (PRILOSEC) 10 MG DR capsule Take 1 capsule daily to protect stomach (Patient not taking: Reported on 9/16/2021)     No current facility-administered medications for this visit.       ALLERGIES:  Patient has no known allergies.       Exam  General- Alert, oriented, nad.  Pleasant and conversant.  Eyes- anicteric, EOMI.  Resps- normal, non-labored.  No cough  Abdomen-  nondistended.   exam- deferred.   Neurological - no tremors  Skin - no discoloration/ lesions noted  Psychiatric - no anxiety, alert & oriented.      The rest of a comprehensive physical examination is deferred due to video visit restrictions.  Imaging/labs:  Lab Results   Component Value Date    CR 0.87 08/19/2020         ASSESSMENT:     Neurogenic ED    PLAN:    Continue sildenafil,     May increase dose up to 100 mg     discussed side effects and how to take.  Empty stomach, especially    If this is not working, consider return to clinic for [facility] teaching    Blood draw for LH, PRL, testosterone.    I will send results on AudioEye to Consulting provider Los Baird        Thank-you for the kind  consultation.  Reji Alvarez MD     Urological Surgeon     Additional Coding Information:    Problems:  3 -- one stable chronic illness    Data Reviewed  none    Level of risk:  4 -- prescription drug management    Time spent:  15 minutes spent on the date of the encounter doing chart review, history and exam, documentation and further activities per the note.  This includes the video call time above

## 2021-09-16 NOTE — NURSING NOTE
Chief Complaint   Patient presents with     Erectile Dysfunction       Height 1.829 m (6'), weight 81.6 kg (180 lb). Body mass index is 24.41 kg/m .    There is no problem list on file for this patient.      No Known Allergies    Current Outpatient Medications   Medication Sig Dispense Refill     docusate sodium (ENEMEEZ) 283 MG enema Place 1 enema rectally daily 30 enema 6     gabapentin (NEURONTIN) 600 MG tablet Take 1 tablet (600 mg) by mouth 2 times daily 180 tablet 1     ibuprofen (ADVIL/MOTRIN) 600 MG tablet        oxyCODONE-acetaminophen (PERCOCET) 5-325 MG tablet Take 1 tablet by mouth nightly as needed for severe pain 30 tablet 0     sildenafil (REVATIO) 20 MG tablet Take 1 tablet (20 mg) by mouth daily as needed (ed) 10 tablet 3     omeprazole (PRILOSEC) 10 MG DR capsule Take 1 capsule daily to protect stomach (Patient not taking: Reported on 9/16/2021)         Social History     Tobacco Use     Smoking status: Former Smoker     Smokeless tobacco: Never Used   Substance Use Topics     Alcohol use: None     Drug use: None       Scottie Martínez EMT  9/16/2021  1:04 PM

## 2021-09-16 NOTE — LETTER
9/16/2021       RE: Nicholas Benavides  [address] [place] [id number]     Dear Colleague,    Thank you for referring your patient, Nicholas Benavides, to the [facility] [place] at [facility]. Please see a copy of my visit note below.    Nicholas is a 36 year old who is being evaluated via a billable video visit.      Video Visit Technology for this patient: Brandy Video Visit- Patient was left in waiting room    How would you like to obtain your AVS? MyChart  If the video visit is dropped, the invitation should be resent by: Send to e-mail at: [email or web address]  Will anyone else be joining your video visit? No      Video Start Time: 1:29 PM  Video-Visit Details    Type of service:  Video Visit    Video End Time:1:40 PM    Originating Location (pt. Location): Home    Distant Location (provider location):  [facility] [place]     Platform used for Video Visit: Brandy    I am seeing Nicholas eBnavides in consultation from Los Baird for evaluation of erectile dysfunction.    HPI:  Nicholas Benavides is a 36 year old male is a very nice man with complaints of erectile dysfunction for the last.  He has history of incomplete SCI and cauda equina syndrome.  His injury was 9 years ago.  Nerve symptoms are roughly the same x 9 years.  First year could not get erection.  After that was getting frequent erections, and this has settled down.    Sensation level is some numbness on the penis.    Symptoms now are   Can get an erection with masturbation.  Hard to reach orgasm.  Premature ejaculation sometimes.  Sometimes can't get the erection.    Just started trying sildenafil, 40mg.  This has been hit or miss.    ED/Vascular disease risk factors:  HTN:   No  Hyperlipidemia: no  Smoking: no   DM: no  Cardiovascular disease: None known  Meds associated with ED that he's taking: longstanding percocet, gabapentin.  Penile Plaques  or curvature:  none.     REVIEW OF SYSTEMS:  General: negative  Skin: negative  Eyes: negative  Ears/Nose/Throat: negative  Respiratory: negative  Cardiovascular: negative  Gastrointestinal: negative  Genitourinary: see HPI  Musculoskeletal: negative  Neurologic: negative  Psychiatric: negative  Hematologic/Lymphatic/Immunologic: negative  Endocrine: negative    PAST MEDICAL HX:  Spinal cord injury     PAST SURG HX:  Spinal fusion  Left wrist fracture.    SOCIAL HX:  Social History     Tobacco Use     Smoking status: Former Smoker     Smokeless tobacco: Never Used   Substance Use Topics     Alcohol use: None     Drug use: None       MEDICATIONS:  Current Outpatient Medications   Medication Sig     docusate sodium (ENEMEEZ) 283 MG enema Place 1 enema rectally daily     gabapentin (NEURONTIN) 600 MG tablet Take 1 tablet (600 mg) by mouth 2 times daily     ibuprofen (ADVIL/MOTRIN) 600 MG tablet      oxyCODONE-acetaminophen (PERCOCET) 5-325 MG tablet Take 1 tablet by mouth nightly as needed for severe pain     sildenafil (REVATIO) 20 MG tablet Take 1 tablet (20 mg) by mouth daily as needed (ed)     omeprazole (PRILOSEC) 10 MG DR capsule Take 1 capsule daily to protect stomach (Patient not taking: Reported on 9/16/2021)     No current facility-administered medications for this visit.       ALLERGIES:  Patient has no known allergies.       Exam  General- Alert, oriented, nad.  Pleasant and conversant.  Eyes- anicteric, EOMI.  Resps- normal, non-labored.  No cough  Abdomen-  nondistended.   exam- deferred.   Neurological - no tremors  Skin - no discoloration/ lesions noted  Psychiatric - no anxiety, alert & oriented.      The rest of a comprehensive physical examination is deferred due to video visit restrictions.  Imaging/labs:  Lab Results   Component Value Date    CR 0.87 08/19/2020         ASSESSMENT:     Neurogenic ED    PLAN:    Continue sildenafil,     May increase dose up to 100 mg     discussed side effects and  how to take.  Empty stomach, especially    If this is not working, consider return to clinic for [facility]CI teaching    Blood draw for LH, PRL, testosterone.    I will send results on BLUEPHOENIXied cc to Consulting provider Los Baird        Thank-you for the kind consultation.  Reji Alvarez MD     Urological Surgeon     Additional Coding Information:    Problems:  3 -- one stable chronic illness    Data Reviewed  none    Level of risk:  4 -- prescription drug management    Time spent:  15 minutes spent on the date of the encounter doing chart review, history and exam, documentation and further activities per the note.  This includes the video call time above

## 2021-10-11 NOTE — TELEPHONE ENCOUNTER
Pt calling to check on the status of Medical recommendation form [facility]. pls follow up with pt discuss.

## 2021-10-12 NOTE — CONFIDENTIAL NOTE
Spoke to Nicholas and advised him we have not received any forms from Evette Lindsay.  He will check into it and get back to me.    Lisette Box on 10/12/2021 at 10:24 AM

## 2021-10-18 DIAGNOSIS — R52 PAIN: ICD-10-CM

## 2021-10-18 NOTE — TELEPHONE ENCOUNTER
[facility] Call Center    Phone Message    May a detailed message be left on voicemail: yes     Reason for Call: Medication Refill Request    Has the patient contacted the pharmacy for the refill? Yes   Name of medication being requested: oxyCODONE-acetaminophen (PERCOCET) 5-325 MG tablet  Provider who prescribed the medication: Dr Baird  Pharmacy:    [facility] [address]  Date medication is needed: ASAP   Pt was told of the 5-7 day turnaround time frame      Action Taken: Message routed to:  [facility]    Travel Screening: Not Applicable

## 2021-10-19 RX ORDER — OXYCODONE AND ACETAMINOPHEN 5; 325 MG/1; MG/1
1 TABLET ORAL
Qty: 30 TABLET | Refills: 0 | Status: SHIPPED | OUTPATIENT
Start: 2021-10-19 | End: 2021-12-06

## 2021-10-19 NOTE — TELEPHONE ENCOUNTER
Patient Requested  oxyCODONE-acetaminophen (PERCOCET) 5-325 MG tablet  Last Filled  09/09/2021  Last Office Visit  08/19/2020  Next Office Visit     Checked  10/19/2021    DX:     Pharmacy:     YOSEF Cabrera RN at 7:09 AM on 10/19/2021.

## 2021-12-02 ENCOUNTER — MYC REFILL (OUTPATIENT)
Dept: INTERNAL MEDICINE | Facility: CLINIC | Age: 36
End: 2021-12-02
Payer: MEDICARE

## 2021-12-02 DIAGNOSIS — R52 PAIN: ICD-10-CM

## 2021-12-08 RX ORDER — OXYCODONE AND ACETAMINOPHEN 5; 325 MG/1; MG/1
1 TABLET ORAL
Qty: 30 TABLET | Refills: 0 | Status: SHIPPED | OUTPATIENT
Start: 2021-12-08 | End: 2022-01-10

## 2021-12-08 NOTE — CONFIDENTIAL NOTE
Note  Patient Requested  oxyCODONE-acetaminophen (PERCOCET) 5-325 MG tablet  Last Filled  10/20/2021    Last Office Visit  06/16/2021  Next Office Visit      Checked  12/08/2021          Pharmacy:      Hallie Lindquist RN 6:43 AM on 12/8/2021.

## 2021-12-25 ENCOUNTER — HEALTH MAINTENANCE LETTER (OUTPATIENT)
Age: 36
End: 2021-12-25

## 2022-01-31 DIAGNOSIS — R52 PAIN: ICD-10-CM

## 2022-01-31 RX ORDER — GABAPENTIN 600 MG/1
600 TABLET ORAL 2 TIMES DAILY
Qty: 180 TABLET | Refills: 1 | Status: SHIPPED | OUTPATIENT
Start: 2022-01-31 | End: 2022-07-22

## 2022-01-31 NOTE — TELEPHONE ENCOUNTER
[facility] Call [facility]    Phone Message    May a detailed message be left on voicemail: yes     Reason for Call: Medication Refill Request    Has the patient contacted the pharmacy for the refill? Yes   Name of medication being requested: gabapentin (NEURONTIN) 600 MG tablet   Provider who prescribed the medication: Los Baird MD  Pharmacy: [id number] - [place], [place] - [address] AT [address] & [address]  Date medication is needed: asap   Please send to [facility] pharmacy Ernie Carias per patient thank you.      Action Taken: Message routed to:  [facility]    Travel Screening: Not Applicable

## 2022-01-31 NOTE — TELEPHONE ENCOUNTER
gabapentin (NEURONTIN) 600 MG tablet      Last Written Prescription Date:  7/6/21  Last Fill Quantity: 180,   # refills: 1  Last Office Visit : 6/16/21  Future Office visit:  0    Routing refill request to provider for review/approval because:  Drug not on the [facility], P or [facility] refill protocol or controlled substance

## 2022-04-18 ENCOUNTER — MYC REFILL (OUTPATIENT)
Dept: INTERNAL MEDICINE | Facility: CLINIC | Age: 37
End: 2022-04-18
Payer: MEDICARE

## 2022-04-18 DIAGNOSIS — R52 PAIN: ICD-10-CM

## 2022-04-18 RX ORDER — OXYCODONE AND ACETAMINOPHEN 5; 325 MG/1; MG/1
1 TABLET ORAL
Qty: 30 TABLET | Refills: 0 | Status: CANCELLED | OUTPATIENT
Start: 2022-04-18

## 2022-05-12 ENCOUNTER — TELEPHONE (OUTPATIENT)
Dept: INTERNAL MEDICINE | Facility: CLINIC | Age: 37
End: 2022-05-12
Payer: MEDICARE

## 2022-05-13 NOTE — TELEPHONE ENCOUNTER
Central Prior Authorization Team   Phone: [phone]      PA Initiation    Medication: docusate sodium (ENEMEEZ) 283 MG enema-PA initiated  Insurance Company: [employer] - Phone [phone] Fax [phone]  Pharmacy Filling the Rx: [facility] - [place], [place] - [address]  Filling Pharmacy Phone: [phone]  Filling Pharmacy Fax:    Start Date: 5/13/2022

## 2022-05-16 NOTE — TELEPHONE ENCOUNTER
Prior Authorization Approval    Authorization Effective Date: 4/13/2022  Authorization Expiration Date: 5/13/2023  Medication: docusate sodium (ENEMEEZ) 283 MG enema-PA approved  Approved Dose/Quantity:   Reference #: [id number]   Insurance Company: [employer] - Phone [phone] Fax [phone]  Expected CoPay:       CoPay Card Available:      Foundation Assistance Needed:    Which Pharmacy is filling the prescription (Not needed for infusion/clinic administered): [facility] - [place], [place] - [address]  Pharmacy Notified: Yes  Patient Notified: No

## 2022-06-20 ENCOUNTER — MYC REFILL (OUTPATIENT)
Dept: INTERNAL MEDICINE | Facility: CLINIC | Age: 37
End: 2022-06-20
Payer: MEDICARE

## 2022-06-20 DIAGNOSIS — R52 PAIN: ICD-10-CM

## 2022-06-20 RX ORDER — OXYCODONE AND ACETAMINOPHEN 5; 325 MG/1; MG/1
1 TABLET ORAL
Qty: 30 TABLET | Refills: 0 | Status: SHIPPED | OUTPATIENT
Start: 2022-06-20 | End: 2022-07-22

## 2022-06-20 NOTE — TELEPHONE ENCOUNTER
Last Fill: 5/17/22 for 30 tabs for 30 day supply.  Last Appt: 6/16/21    Alicia Mars LPN 6/20/2022 3:33 PM

## 2022-06-20 NOTE — TELEPHONE ENCOUNTER
oxyCODONE-acetaminophen (PERCOCET) 5-325 MG tablet          The original prescription was reordered on 5/17/2022 by Los Baird MD. Renewing this prescription may not be appropriate.

## 2022-07-22 ENCOUNTER — MYC REFILL (OUTPATIENT)
Dept: INTERNAL MEDICINE | Facility: CLINIC | Age: 37
End: 2022-07-22

## 2022-07-22 DIAGNOSIS — R52 PAIN: ICD-10-CM

## 2022-07-22 RX ORDER — OXYCODONE AND ACETAMINOPHEN 5; 325 MG/1; MG/1
1 TABLET ORAL
Qty: 30 TABLET | Refills: 0 | Status: SHIPPED | OUTPATIENT
Start: 2022-07-22 | End: 2022-08-25

## 2022-07-22 RX ORDER — GABAPENTIN 600 MG/1
TABLET ORAL
Qty: 180 TABLET | Refills: 1 | OUTPATIENT
Start: 2022-07-22

## 2022-07-22 RX ORDER — GABAPENTIN 600 MG/1
600 TABLET ORAL 2 TIMES DAILY
Qty: 180 TABLET | Refills: 0 | Status: SHIPPED | OUTPATIENT
Start: 2022-07-22 | End: 2022-11-04

## 2022-07-22 NOTE — TELEPHONE ENCOUNTER
[facility] site reviewed today. Gabapentin last filled 10/27/21. Unable to review records from [place].     Patient is due for annual appointment. Medication refilled per protocol.     Sallie Saucedo RN (Brasch)

## 2022-07-22 NOTE — TELEPHONE ENCOUNTER
[facility] Call [facility]    Phone Message    May a detailed message be left on voicemail: yes     Reason for Call: Medication Refill Request    Has the patient contacted the pharmacy for the refill? Yes   Name of medication being requested: gabapentin (NEURONTIN) 600 MG tablet  Provider who prescribed the medication: Los Baird MD  Pharmacy: [facility] [address]  Date medication is needed: asap    Requesting meds to be filled at [facility].     Patient was in [place], [place] during the winter. Patient did not know that auto refill is on for [facility] location. Unable to stop automatic refill at [place] location in [facility].       Action Taken: Message routed to:  [facility]    Travel Screening: Not Applicable

## 2022-07-22 NOTE — TELEPHONE ENCOUNTER
site reviewed today. Percocet last filled 6/20/22 fro #30.    Patient is due for annual appointment with PCP. Message sent via [employer].    Will send to PCP for review.    Sallie Saucedo RN (Brasch)

## 2022-07-22 NOTE — TELEPHONE ENCOUNTER
gabapentin (NEURONTIN) 600 MG tablet   Refill addressed in pt call encounter 7-22-22     See pt call note: pt requests [facility]  Pharm called, this rf denied.

## 2022-07-22 NOTE — TELEPHONE ENCOUNTER
gabapentin (NEURONTIN) 600 MG tablet       Last Written Prescription Date:  1-31-22  Last Fill Quantity: 180,   # refills: 1  Last Office Visit : 6-16-22  Future Office visit:  none    Routing refill request to provider for review/approval because:  Drug not on the [facility], [facility] or [facility] refill protocol or controlled substance  Past due appt

## 2022-09-17 DIAGNOSIS — R52 PAIN: ICD-10-CM

## 2022-09-21 NOTE — TELEPHONE ENCOUNTER
docusate sodium (ENEMEEZ) 283 MG enema  Last Written Prescription Date:  8/27/21  Last Fill Quantity: 30,   # refills: 6  Last Office Visit : 6/16/21  Future Office visit:  10/10/22    Routing refill request to provider for review/approval because: gap in med rf

## 2022-09-22 RX ORDER — DOCUSATE SODIUM 283 MG/5ML
LIQUID RECTAL
Qty: 150 ML | Refills: 6 | Status: SHIPPED | OUTPATIENT
Start: 2022-09-22 | End: 2023-02-01

## 2022-09-27 ENCOUNTER — MYC REFILL (OUTPATIENT)
Dept: INTERNAL MEDICINE | Facility: CLINIC | Age: 37
End: 2022-09-27

## 2022-09-27 DIAGNOSIS — R52 PAIN: ICD-10-CM

## 2022-09-28 RX ORDER — OXYCODONE AND ACETAMINOPHEN 5; 325 MG/1; MG/1
1 TABLET ORAL
Qty: 30 TABLET | Refills: 0 | Status: SHIPPED | OUTPATIENT
Start: 2022-09-28 | End: 2022-10-31

## 2022-09-28 NOTE — TELEPHONE ENCOUNTER
oxyCODONE-acetaminophen (PERCOCET) 5-325 MG tablet  Last Written Prescription Date:  8/26/22  Last Fill Quantity: 30,   # refills: 0   Last Office Visit : 6-16-21  Future Office visit: 10/10/22 BRIDGETT Negrete refill request to provider for review/approval because:  controlled substance, LAST SEEN 6/16/21, UPCOMING APPT 10/10/22

## 2022-10-10 ENCOUNTER — OFFICE VISIT (OUTPATIENT)
Dept: INTERNAL MEDICINE | Facility: CLINIC | Age: 37
End: 2022-10-10
Payer: MEDICARE

## 2022-10-10 VITALS
WEIGHT: 173.3 LBS | SYSTOLIC BLOOD PRESSURE: 128 MMHG | DIASTOLIC BLOOD PRESSURE: 77 MMHG | OXYGEN SATURATION: 94 % | HEIGHT: 72 IN | HEART RATE: 103 BPM | BODY MASS INDEX: 23.47 KG/M2

## 2022-10-10 DIAGNOSIS — Z13.220 SCREENING CHOLESTEROL LEVEL: Primary | ICD-10-CM

## 2022-10-10 DIAGNOSIS — Z23 NEED FOR VACCINATION: ICD-10-CM

## 2022-10-10 PROCEDURE — 90686 IIV4 VACC NO PRSV 0.5 ML IM: CPT | Performed by: INTERNAL MEDICINE

## 2022-10-10 PROCEDURE — 99214 OFFICE O/P EST MOD 30 MIN: CPT | Mod: 25 | Performed by: INTERNAL MEDICINE

## 2022-10-10 PROCEDURE — G0008 ADMIN INFLUENZA VIRUS VAC: HCPCS | Performed by: INTERNAL MEDICINE

## 2022-10-10 NOTE — NURSING NOTE
Nicholas Benavides is a 37 year old male patient that presents today in clinic for the following:    Chief Complaint   Patient presents with     Physical     Routine     The patient's allergies and medications were reviewed as noted. A set of vitals were recorded as noted without incident. The patient does not have any other questions for the provider.    Scottie Holland, EMT at 5:52 PM on 10/10/2022

## 2022-10-24 ENCOUNTER — DOCUMENTATION ONLY (OUTPATIENT)
Dept: INTERNAL MEDICINE | Facility: CLINIC | Age: 37
End: 2022-10-24

## 2022-10-24 NOTE — PROGRESS NOTES
Type of Form Received: DETAILED PRESCRIPTION    Form Received (Date) 10/24/22   Form Filled out YES 10/31/22   Placed in provider folder Yes

## 2022-10-28 ENCOUNTER — MEDICAL CORRESPONDENCE (OUTPATIENT)
Dept: HEALTH INFORMATION MANAGEMENT | Facility: CLINIC | Age: 37
End: 2022-10-28

## 2022-11-02 DIAGNOSIS — R52 PAIN: ICD-10-CM

## 2022-11-04 ENCOUNTER — NURSE TRIAGE (OUTPATIENT)
Dept: NURSING | Facility: CLINIC | Age: 37
End: 2022-11-04

## 2022-11-04 ENCOUNTER — MYC REFILL (OUTPATIENT)
Dept: INTERNAL MEDICINE | Facility: CLINIC | Age: 37
End: 2022-11-04

## 2022-11-04 DIAGNOSIS — R52 PAIN: ICD-10-CM

## 2022-11-04 RX ORDER — GABAPENTIN 600 MG/1
600 TABLET ORAL 2 TIMES DAILY
Qty: 60 TABLET | Refills: 0 | Status: SHIPPED | OUTPATIENT
Start: 2022-11-04 | End: 2022-11-07

## 2022-11-04 RX ORDER — GABAPENTIN 600 MG/1
600 TABLET ORAL 2 TIMES DAILY
Qty: 180 TABLET | Refills: 0 | Status: CANCELLED | OUTPATIENT
Start: 2022-11-04

## 2022-11-04 RX ORDER — GABAPENTIN 600 MG/1
TABLET ORAL
Qty: 180 TABLET | Refills: 0
Start: 2022-11-04

## 2022-11-04 NOTE — TELEPHONE ENCOUNTER
[facility] Call [facility]    Phone Message    May a detailed message be left on voicemail: yes     Reason for Call: Medication Refill Request    Has the patient contacted the pharmacy for the refill? Yes   Name of medication being requested: gabapentin (NEURONTIN) 600 MG tablet     Provider who prescribed the medication: Dr. Baird  Pharmacy: [facility] [address] [place] [address]  Date medication is needed: 11/4/22

## 2022-11-04 NOTE — TELEPHONE ENCOUNTER
gabapentin (NEURONTIN) 600 MG tablet  Last Written Prescription Date:  7/22/22  Last Fill Quantity: 180,   # refills: 0  Last Office Visit : 10/10/22  Future Office visit:  None    Routing refill request to provider for review/approval because: not on protocol

## 2022-11-04 NOTE — TELEPHONE ENCOUNTER
Patient called, following up on status of script. Per patient, was seen recently with Dr Baird and medication was discussed as well. Per patient, has a day and half left of pills. Please review and reach out to patient for updates.

## 2022-11-05 NOTE — TELEPHONE ENCOUNTER
Triage Call:    Patient calling to report [facility] advised that his prescription for Gabapentin was refused.  He reports he will be out in the morning.  He had a yearly exam on 10/10/2022.  Awaiting authorization ordered pended.    On-call provider, Dr Alejandra, advised to order 1 month supply of Gabapentin.  Routing note to [place] to follow up.    Johana Ray RN  11/04/22 7:45 PM  [facility] Nurse Advisor    Reason for Disposition    [1] Prescription refill request for ESSENTIAL medicine (i.e., likelihood of harm to patient if not taken) AND [2] triager unable to refill per department policy    Additional Information    Negative: New-onset or worsening symptoms, see that guideline (e.g., diarrhea, runny nose, sore throat)    Negative: Medicine question not related to refill or renewal    Negative: Caller (e.g., patient or pharmacist) requesting information about a new medicine    Negative: Caller requesting information unrelated to medicine    Protocols used: MEDICATION REFILL AND RENEWAL CALL-A-

## 2022-11-07 RX ORDER — GABAPENTIN 600 MG/1
600 TABLET ORAL 2 TIMES DAILY
Qty: 60 TABLET | Refills: 1 | Status: SHIPPED | OUTPATIENT
Start: 2022-11-07 | End: 2023-01-02

## 2022-11-07 NOTE — TELEPHONE ENCOUNTER
gabapentin (NEURONTIN) 600 MG tablet  Last Written Prescription Date:  11/4/2022  Last Fill Quantity: 60,   # refills: 0  Last Office Visit :  10/10/2022  Future Office visit:  None    Routing refill request to provider for review/approval because:  Second request  Not on protocol to fill  Refer to Provider for review       Varsha Gavin RN  Central Triage Red Flags/Med Refills

## 2022-11-28 NOTE — TELEPHONE ENCOUNTER
gabapentin (NEURONTIN) 600 MG tablet    Patient inquiring about if he has a refill for the above medication. Please call to discuss this in detail with patient he has some confusion on refill and wants to make sure no issues in the future with medication refills.  Thank you

## 2023-02-01 DIAGNOSIS — R52 PAIN: ICD-10-CM

## 2023-02-01 RX ORDER — OXYCODONE AND ACETAMINOPHEN 5; 325 MG/1; MG/1
1 TABLET ORAL
Qty: 30 TABLET | Refills: 0 | Status: SHIPPED | OUTPATIENT
Start: 2023-02-01 | End: 2023-03-03

## 2023-02-01 RX ORDER — DOCUSATE SODIUM 283 MG/5ML
1 LIQUID RECTAL DAILY
Qty: 150 ML | Refills: 1 | Status: SHIPPED | OUTPATIENT
Start: 2023-02-01 | End: 2023-06-07

## 2023-02-01 NOTE — TELEPHONE ENCOUNTER
[facility] Call [facility]    Phone Message    May a detailed message be left on voicemail: yes     Reason for Call: Medication Refill Request    Has the patient contacted the pharmacy for the refill? Yes   Name of medication being requested: ENEMEEZ MINI 283 MG/5ML enema, oxyCODONE-acetaminophen (PERCOCET) 5-325 MG tablet   Provider who prescribed the medication: Dr. Baird  Pharmacy: [id number] [place] [place] - [address]  Date medication is needed: asap         Action Taken: Message routed to:  [facility]    Travel Screening: Not Applicable

## 2023-02-01 NOTE — TELEPHONE ENCOUNTER
oxyCODONE-acetaminophen (PERCOCET) 5-325 MG tablet      Last Written Prescription Date:  1/3/23  Last Fill Quantity: 30   # refills: 0  Last Office Visit : 10/10/22  Future Office visit:  0    Routing refill request to provider for review/approval because:   controlled substance      ENEMEEZ MINI 283 MG/5ML enema  Requesting for refill -Luis Lucero    2 refills remain at [facility]  Remaining refills sent to requested pharmacy.

## 2023-02-27 DIAGNOSIS — R52 PAIN: ICD-10-CM

## 2023-03-02 DIAGNOSIS — R52 PAIN: ICD-10-CM

## 2023-03-02 NOTE — TELEPHONE ENCOUNTER
[facility] Call [facility]    Phone Message    May a detailed message be left on voicemail: yes     Reason for Call: Medication Refill Request    Has the patient contacted the pharmacy for the refill? Yes   Name of medication being requested: gabapentin (NEURONTIN) 600 MG tablet  oxyCODONE-acetaminophen (PERCOCET) 5-325 MG tablet  Provider who prescribed the medication: PCP  Pharmacy: [facility] #[id number] [place] - [address] AT [address] (Ph: [phone])  Date medication is needed: ASAP         Action Taken: Message routed to:  [facility] PCP    Travel Screening: Not Applicable

## 2023-03-02 NOTE — TELEPHONE ENCOUNTER
GABAPENTIN 600MG TABLETS      Last Written Prescription Date:  1/3/23  Last Fill Quantity: 60,   # refills: 1  Last Office Visit : 10/10/22  Future Office visit:  none    Routing refill request to provider for review/approval because:  Drug not on the [facility], P or [facility] refill protocol or controlled substance

## 2023-03-03 RX ORDER — GABAPENTIN 600 MG/1
600 TABLET ORAL 2 TIMES DAILY
Qty: 60 TABLET | Refills: 1 | Status: SHIPPED | OUTPATIENT
Start: 2023-03-03 | End: 2023-05-03

## 2023-03-03 RX ORDER — OXYCODONE AND ACETAMINOPHEN 5; 325 MG/1; MG/1
1 TABLET ORAL
Qty: 30 TABLET | Refills: 0 | Status: SHIPPED | OUTPATIENT
Start: 2023-03-03 | End: 2023-04-11

## 2023-03-03 NOTE — TELEPHONE ENCOUNTER
oxyCODONE-acetaminophen (PERCOCET) 5-325 MG tablet      Last Written Prescription Date:  1-3-23  Last Fill Quantity: 60,   # refills: 1  Last Office Visit : 10-10-22  Future Office visit:  none    Routing refill request to provider for review/approval because:  Drug not on the [facility], [facility] or [facility] refill protocol or controlled substance      Pt call comment:  Date medication is needed: ASAP                  gabapentin (NEURONTIN) 600 MG tablet:   addressed in 2-27-23 RF encounter, resent as high priority.

## 2023-04-29 DIAGNOSIS — R52 PAIN: ICD-10-CM

## 2023-05-03 RX ORDER — GABAPENTIN 600 MG/1
600 TABLET ORAL 2 TIMES DAILY
Qty: 60 TABLET | Refills: 3 | Status: SHIPPED | OUTPATIENT
Start: 2023-05-03 | End: 2023-06-07

## 2023-05-03 NOTE — TELEPHONE ENCOUNTER
GABAPENTIN 600MG TABLETS     Last Written Prescription Date:  3/3/23  Last Fill Quantity: 60,   # refills: 1  Last Office Visit : 10/10/22  Future Office visit:  none    Routing refill request to provider for review/approval because:  Drug not on the [facility], [facility] or [facility] refill protocol

## 2023-06-07 DIAGNOSIS — R52 PAIN: ICD-10-CM

## 2023-06-07 RX ORDER — DOCUSATE SODIUM 283 MG/5ML
1 LIQUID RECTAL DAILY
Qty: 150 ML | Refills: 4 | Status: SHIPPED | OUTPATIENT
Start: 2023-06-07

## 2023-06-07 RX ORDER — GABAPENTIN 600 MG/1
600 TABLET ORAL 2 TIMES DAILY
Qty: 60 TABLET | Refills: 2 | Status: SHIPPED | OUTPATIENT
Start: 2023-06-07 | End: 2023-09-06

## 2023-06-07 NOTE — TELEPHONE ENCOUNTER
[facility] Call [facility]    Phone Message    May a detailed message be left on voicemail: yes     Reason for Call: Medication Refill Request    Has the patient contacted the pharmacy for the refill? Yes   Name of medication being requested: gabapentin (NEURONTIN) 600 MG tablet  docusate sodium (ENEMEEZ MINI) 283 MG enema  Provider who prescribed the medication: Dr. Baird  Pharmacy: [facility] [address]  Date medication is needed: as soon as able per patient,          Action Taken: Message routed to:  [facility]     Travel Screening: Not Applicable

## 2023-06-07 NOTE — TELEPHONE ENCOUNTER
gabapentin (NEURONTIN) 600 MG tablet      Last Written Prescription Date:  5-3-23  Last Fill Quantity: 60,   # refills: 3  Last Office Visit : 10-10-22  Future Office visit:  none    Routing refill request to provider for review/approval because:  Drug not on the [facility], P or [facility] refill protocol or controlled substance  Pt requesting pharm change- sign Rx for remaining RF         docusate sodium (ENEMEEZ MINI) 283 MG enema  Last Written Prescription Date:  2-1-23  Last Fill Quantity: 150 ml,   # refills: 1   ml:4

## 2023-08-03 ENCOUNTER — MYC REFILL (OUTPATIENT)
Dept: INTERNAL MEDICINE | Facility: CLINIC | Age: 38
End: 2023-08-03
Payer: MEDICARE

## 2023-08-03 DIAGNOSIS — R52 PAIN: ICD-10-CM

## 2023-08-04 RX ORDER — OXYCODONE AND ACETAMINOPHEN 5; 325 MG/1; MG/1
1 TABLET ORAL
Qty: 30 TABLET | Refills: 0 | Status: SHIPPED | OUTPATIENT
Start: 2023-08-04 | End: 2023-09-05

## 2023-09-05 ENCOUNTER — MYC REFILL (OUTPATIENT)
Dept: INTERNAL MEDICINE | Facility: CLINIC | Age: 38
End: 2023-09-05
Payer: MEDICARE

## 2023-09-05 DIAGNOSIS — R52 PAIN: ICD-10-CM

## 2023-09-06 ENCOUNTER — TELEPHONE (OUTPATIENT)
Dept: INTERNAL MEDICINE | Facility: CLINIC | Age: 38
End: 2023-09-06
Payer: MEDICARE

## 2023-09-06 DIAGNOSIS — R52 PAIN: ICD-10-CM

## 2023-09-06 NOTE — CONFIDENTIAL NOTE
[facility] Call Center    Phone Message    May a detailed message be left on voicemail: yes     Reason for Call: Medication Question or concern regarding medication   Prescription Clarification  Name of Medication:     gabapentin (NEURONTIN) 600 MG tablet     Prescribing Provider: Dr Baird   Pharmacy: [facility]   What on the order needs clarification? Patient states he has told Luis several times he no longer wants them to fill his RX, patient wants it sent to [facility], advised patient to call and file a complaint with Luis that they keep trying to auto fill it, patient voiced understanding but wants to make sure that this renewal is sent to [facility], Thank you      Action Taken: Other: primary    Travel Screening: Not Applicable

## 2023-09-06 NOTE — TELEPHONE ENCOUNTER
gabapentin (NEURONTIN) 600 MG tablet      Last Written Prescription Date:  6-7-23  Last Fill Quantity: 60t,   # refills: 2  Last Office Visit : 10-10-22  Future Office visit:  none    Routing refill request to provider for review/approval because:  Drug not on the [facility], P or [facility] refill protocol or controlled substance        FYI to clinic:  What on the order needs clarification? Patient states he has told Luis several times he no longer wants them to fill his RX, patient wants it sent to [facility], advised patient to call and file a complaint with Luis that they keep trying to auto fill it, patient voiced understanding but wants to make sure that this renewal is sent to [facility], Thank you

## 2023-09-07 RX ORDER — GABAPENTIN 600 MG/1
600 TABLET ORAL 2 TIMES DAILY
Qty: 60 TABLET | Refills: 1 | Status: SHIPPED | OUTPATIENT
Start: 2023-09-07 | End: 2023-11-07

## 2023-09-07 RX ORDER — OXYCODONE AND ACETAMINOPHEN 5; 325 MG/1; MG/1
1 TABLET ORAL
Qty: 30 TABLET | Refills: 0 | Status: SHIPPED | OUTPATIENT
Start: 2023-09-07 | End: 2023-10-05

## 2023-09-07 NOTE — TELEPHONE ENCOUNTER
Percocet last prescribed 8/4 for #30 tablets.    Will send to PCP for review.    Sallie Saucedo RN (Brasch)

## 2023-09-07 NOTE — TELEPHONE ENCOUNTER
Last appointment with PCP on 10/10/22. Gabapentin last dispensed 8/6 for #60, a 30-day supply.    Medication refilled. Patient is due for an annual appointment next month.    Sallie Saucedo RN (Brasch)

## 2023-10-05 ENCOUNTER — MYC REFILL (OUTPATIENT)
Dept: INTERNAL MEDICINE | Facility: CLINIC | Age: 38
End: 2023-10-05
Payer: MEDICARE

## 2023-10-05 DIAGNOSIS — R52 PAIN: ICD-10-CM

## 2023-10-06 RX ORDER — OXYCODONE AND ACETAMINOPHEN 5; 325 MG/1; MG/1
1 TABLET ORAL
Qty: 30 TABLET | Refills: 0 | Status: SHIPPED | OUTPATIENT
Start: 2023-10-06 | End: 2023-11-07

## 2023-10-10 NOTE — TELEPHONE ENCOUNTER
Centralized Medication Refill Team note:     docusate sodium (ENEMEEZ) 283 MG enema  Place 1 enema rectally daily - Rectal  Last Written Prescription Date:  3/3/21  Last Fill Quantity: 30,   # refills: 4  Last Office Visit : 6/16/21  Future Office visit:  None     Refill sent:     docusate sodium (ENEMEEZ) 283 MG enema      Provider who prescribed the medication: Dr. Baird  Pharmacy: [facility]  Date medication is needed: 8/27/21   [address]  FAMILY MEDICINE RESIDENCY PROGRAM  DATE OF VISIT : 2022    Patient : Guadalupe Sexton   Age : 78 y.o.  : 1944   MRN : [id number]   ______________________________________________________________________    Chief Complaint :   Chief Complaint   Patient presents with    Suture / Staple Removal     Staple removal from head. HPI : Guadalupe Sexton is 78 y.o. female who presented to the clinic today for hypertension follow up and staple removal.     Fall- She had a mechanical fall 10 days ago where she fell and hit her head against bedside table. No LOC. CTH in ER neg. 5 staples were placed for laceration. Patient states that the wound has been healing well without evidence of erythema, purulent drainage, or increased pain. The patient denies a sensation of foreign body. The patient denies any fevers. HTN-On amlodipine 2.5 mg daily. BP at home controlled. Asymptomatic           Past Medical History :  Past Medical History:   Diagnosis Date    Primary hypertension      No past surgical history on file. Allergies :   No Known Allergies    Medication List :    Current Outpatient Medications   Medication Sig Dispense Refill    calcium-vitamin D (OSCAL-500) 500-5 MG-MCG TABS per tablet Take 1 tablet by mouth daily 30 tablet 3    alendronate (FOSAMAX) 70 MG tablet Take 1 tablet by mouth every 7 days 12 tablet 3    amLODIPine (NORVASC) 2.5 MG tablet Take 1 tablet by mouth daily 90 tablet 3    sodium chloride (ALTAMIST SPRAY) 0.65 % nasal spray 1 spray by Nasal route as needed for Congestion 1 each 3    Blood Pressure Monitoring KIT 1 each by Does not apply route daily Please give patient watever cover from her insurance 1 kit 0     No current facility-administered medications for this visit. Review of Systems :  Review of Systems   Constitutional:  Negative for chills and fever. Respiratory:  Negative for cough and shortness of breath.     Cardiovascular:  Negative

## 2023-11-05 ENCOUNTER — MEDICAL CORRESPONDENCE (OUTPATIENT)
Dept: HEALTH INFORMATION MANAGEMENT | Facility: CLINIC | Age: 38
End: 2023-11-05
Payer: MEDICARE

## 2023-11-06 ENCOUNTER — DOCUMENTATION ONLY (OUTPATIENT)
Dept: INTERNAL MEDICINE | Facility: CLINIC | Age: 38
End: 2023-11-06
Payer: MEDICARE

## 2023-11-06 NOTE — PROGRESS NOTES
Type of Form Received:     Form Received (Date) 11/2/23   Form Filled out Yes, faxed 11/6   Placed in provider folder Yes

## 2023-11-07 ENCOUNTER — TELEPHONE (OUTPATIENT)
Dept: INTERNAL MEDICINE | Facility: CLINIC | Age: 38
End: 2023-11-07
Payer: MEDICARE

## 2023-11-07 ENCOUNTER — MYC REFILL (OUTPATIENT)
Dept: INTERNAL MEDICINE | Facility: CLINIC | Age: 38
End: 2023-11-07
Payer: MEDICARE

## 2023-11-07 DIAGNOSIS — R52 PAIN: ICD-10-CM

## 2023-11-07 NOTE — TELEPHONE ENCOUNTER
[facility] Call Center    Phone Message    May a detailed message be left on voicemail: yes     Reason for Call: Other: Anjelica, requesting that fax be sent to [phone], stated she has not received the fax that was sent on 11/6/23. Thank you     Action Taken: Message routed to:  [facility]      Travel Screening: Not Applicable

## 2023-11-08 RX ORDER — GABAPENTIN 600 MG/1
600 TABLET ORAL 2 TIMES DAILY
Qty: 60 TABLET | Refills: 1 | Status: SHIPPED | OUTPATIENT
Start: 2023-11-08 | End: 2024-01-03

## 2023-11-08 RX ORDER — IBUPROFEN 600 MG/1
TABLET, FILM COATED ORAL
OUTPATIENT
Start: 2023-11-08

## 2023-11-08 RX ORDER — OXYCODONE AND ACETAMINOPHEN 5; 325 MG/1; MG/1
1 TABLET ORAL
Qty: 30 TABLET | Refills: 0 | Status: SHIPPED | OUTPATIENT
Start: 2023-11-08 | End: 2023-12-06

## 2023-11-08 NOTE — TELEPHONE ENCOUNTER
gabapentin (NEURONTIN) 600 MG tablet   Last Written Prescription Date:  9/7/2023  Last Fill Quantity: 60,   # refills: 1  Last Office Visit : 10/10/2022  Future Office visit:  None  Routing refill request to provider for review/approval because:  Drug not on the [facility], [facility] or [facility] refill protocol or controlled substance    oxyCODONE-acetaminophen (PERCOCET) 5-325 MG tablet   Last Written Prescription Date:  10/6/2023  Last Fill Quantity: 30,   # refills: 0  Last Office Visit : 10/10/2022  Future Office visit:  None  Routing refill request to provider for review/approval because:  Drug not on the [facility], [facility] or [facility] refill protocol or controlled substance    ibuprofen (ADVIL/MOTRIN) 600 MG tablet   Last Written Prescription Date:  ?  Last Fill Quantity: ?  # refills: ?  Last Office Visit : 10/10/2022  Future Office visit:  None  Routing refill request to provider for review/approval because:  Reported Historical     Varsha Gavin RN  Central Triage Red Flags/Med Refills

## 2023-12-06 ENCOUNTER — MYC REFILL (OUTPATIENT)
Dept: INTERNAL MEDICINE | Facility: CLINIC | Age: 38
End: 2023-12-06
Payer: MEDICARE

## 2023-12-06 DIAGNOSIS — R52 PAIN: ICD-10-CM

## 2023-12-08 RX ORDER — OXYCODONE AND ACETAMINOPHEN 5; 325 MG/1; MG/1
1 TABLET ORAL
Qty: 30 TABLET | Refills: 0 | Status: SHIPPED | OUTPATIENT
Start: 2023-12-10 | End: 2024-01-03

## 2024-01-03 ENCOUNTER — MYC REFILL (OUTPATIENT)
Dept: INTERNAL MEDICINE | Facility: CLINIC | Age: 39
End: 2024-01-03
Payer: MEDICARE

## 2024-01-03 DIAGNOSIS — R52 PAIN: ICD-10-CM

## 2024-01-04 DIAGNOSIS — R52 PAIN: ICD-10-CM

## 2024-01-04 RX ORDER — OXYCODONE AND ACETAMINOPHEN 5; 325 MG/1; MG/1
1 TABLET ORAL
Qty: 30 TABLET | Refills: 0 | Status: SHIPPED | OUTPATIENT
Start: 2024-01-04 | End: 2024-01-09

## 2024-01-04 RX ORDER — GABAPENTIN 600 MG/1
600 TABLET ORAL 2 TIMES DAILY
Qty: 60 TABLET | Refills: 1 | Status: SHIPPED | OUTPATIENT
Start: 2024-01-04 | End: 2024-03-08

## 2024-01-04 NOTE — TELEPHONE ENCOUNTER
[facility] Call Center    Phone Message    May a detailed message be left on voicemail: yes     Reason for Call: Other: Per pt was told by then pharmacy that pt needs a PA for medication oxyCODONE-acetaminophen (PERCOCET) 5-325 MG tablet. Please send one in asap. Thank you!     Action Taken: Message routed to:  [facility] PCC    Travel Screening: Not Applicable

## 2024-01-04 NOTE — TELEPHONE ENCOUNTER
Prior Authorization Retail Medication Request    Medication/Dose: oxyCODONE-acetaminophen (PERCOCET) 5-325 MG tablet  Diagnosis and ICD code (if different than what is on RX):    New/renewal/insurance change PA/secondary ins. PA:  Previously Tried and Failed:    Rationale:      Insurance   Primary:   Insurance ID:      Secondary (if applicable):  Insurance ID:      Pharmacy Information (if different than what is on RX)  Name:    Phone:    Fax:

## 2024-01-09 RX ORDER — OXYCODONE AND ACETAMINOPHEN 5; 325 MG/1; MG/1
1 TABLET ORAL
Qty: 30 TABLET | Refills: 0 | Status: SHIPPED | OUTPATIENT
Start: 2024-01-09 | End: 2024-01-13

## 2024-01-09 NOTE — TELEPHONE ENCOUNTER
Prior Authorization Not Needed per Insurance    Medication: OXYCODONE-ACETAMINOPHEN 5-325 MG PO TABS  Insurance Company: [employer] Part D - Phone [phone] Fax [phone]  Expected CoPay: $    Pharmacy Filling the Rx: [id number], [place] - [address] [facility]  Pharmacy Notified: y  Patient Notified: y - pharmacy to notify    Spoke with pharmacy, patient filled 7 day supply rendering the PA requirement as not needed.

## 2024-01-11 NOTE — TELEPHONE ENCOUNTER
[facility] Call Center    Phone Message    May a detailed message be left on voicemail: yes     Reason for Call: Medication Question or concern regarding medication   Prescription Clarification  Name of Medication: oxyCODONE-acetaminophen (PERCOCET) 5-325 MG tablet   Prescribing Provider: PCP:   Los Baird MD    Pharmacy: [id number] [address]    What on the order needs clarification?   The patient was calling to check on the update for the reminding 23 tablets to be filled whiled he's in [facility] please review and follow up thank you.      Action Taken: Message routed to:  [facility] pcc    Travel Screening: Not Applicable

## 2024-01-13 DIAGNOSIS — R52 PAIN: ICD-10-CM

## 2024-01-13 RX ORDER — OXYCODONE AND ACETAMINOPHEN 5; 325 MG/1; MG/1
1 TABLET ORAL
Qty: 30 TABLET | Refills: 0 | Status: SHIPPED | OUTPATIENT
Start: 2024-01-13 | End: 2024-08-26

## 2024-01-14 ENCOUNTER — HEALTH MAINTENANCE LETTER (OUTPATIENT)
Age: 39
End: 2024-01-14

## 2024-01-17 NOTE — TELEPHONE ENCOUNTER
OK to send the balance of his percocet to the pharmacy    JACQUELINE Baird    oxyCODONE-acetaminophen (PERCOCET) 5-325 MG tablet [[id number]]  Los Baird MD  [id number] - [place], [place] - [address] AT [facility]  P: [phone]  F: [phone]      Pharmacy only released 7 tablets on 1/5/24.  Per Epic, 30 tablets were sent to [facility] on 1/13/24.  Needs to be resent to correct pharmacy.  Pls give patient update

## 2024-01-23 RX ORDER — OXYCODONE AND ACETAMINOPHEN 5; 325 MG/1; MG/1
1 TABLET ORAL
Qty: 30 TABLET | Refills: 0 | Status: SHIPPED | OUTPATIENT
Start: 2024-01-23 | End: 2024-02-19

## 2024-02-19 ENCOUNTER — MYC REFILL (OUTPATIENT)
Dept: INTERNAL MEDICINE | Facility: CLINIC | Age: 39
End: 2024-02-19
Payer: MEDICARE

## 2024-02-19 DIAGNOSIS — R52 PAIN: ICD-10-CM

## 2024-02-20 RX ORDER — OXYCODONE AND ACETAMINOPHEN 5; 325 MG/1; MG/1
1 TABLET ORAL
Qty: 30 TABLET | Refills: 0 | Status: SHIPPED | OUTPATIENT
Start: 2024-02-20 | End: 2024-03-15

## 2024-03-04 DIAGNOSIS — R52 PAIN: ICD-10-CM

## 2024-03-08 RX ORDER — GABAPENTIN 600 MG/1
600 TABLET ORAL 2 TIMES DAILY
Qty: 60 TABLET | Refills: 0 | Status: SHIPPED | OUTPATIENT
Start: 2024-03-08 | End: 2024-04-10

## 2024-03-08 NOTE — TELEPHONE ENCOUNTER
gabapentin (NEURONTIN) 600 MG tablet   60 tablet 1 1/4/2024       Last Office Visit : 10-  Future Office visit:    4-

## 2024-03-15 ENCOUNTER — MYC REFILL (OUTPATIENT)
Dept: INTERNAL MEDICINE | Facility: CLINIC | Age: 39
End: 2024-03-15
Payer: MEDICARE

## 2024-03-15 DIAGNOSIS — R52 PAIN: ICD-10-CM

## 2024-03-19 RX ORDER — OXYCODONE AND ACETAMINOPHEN 5; 325 MG/1; MG/1
1 TABLET ORAL
Qty: 30 TABLET | Refills: 0 | Status: SHIPPED | OUTPATIENT
Start: 2024-03-19 | End: 2024-04-14

## 2024-04-07 DIAGNOSIS — R52 PAIN: ICD-10-CM

## 2024-04-10 RX ORDER — GABAPENTIN 600 MG/1
600 TABLET ORAL 2 TIMES DAILY
Qty: 60 TABLET | Refills: 0 | Status: SHIPPED | OUTPATIENT
Start: 2024-04-10 | End: 2024-04-11

## 2024-04-10 NOTE — TELEPHONE ENCOUNTER
Patient is calling checking the status on this. Please call and advise as soon as you can. He's been out since today.

## 2024-04-11 RX ORDER — GABAPENTIN 600 MG/1
600 TABLET ORAL 2 TIMES DAILY
Qty: 60 TABLET | Refills: 0 | Status: SHIPPED | OUTPATIENT
Start: 2024-04-11 | End: 2024-05-07

## 2024-04-11 NOTE — TELEPHONE ENCOUNTER
Pt called and stated his pharmacy stated they do not have the script of   gabapentin (NEURONTIN) 600 MG tablet , pt requesting it be re-sent to them asap

## 2024-04-11 NOTE — TELEPHONE ENCOUNTER
Called patient- verified that this still needs to go to  pharmacy. Called [facility] in [place]- they do not have an rx. Will resend.    Dustin Wolfe RN on 4/11/2024 at 3:41 PM

## 2024-04-14 ENCOUNTER — MYC REFILL (OUTPATIENT)
Dept: INTERNAL MEDICINE | Facility: CLINIC | Age: 39
End: 2024-04-14
Payer: MEDICARE

## 2024-04-14 DIAGNOSIS — R52 PAIN: ICD-10-CM

## 2024-04-15 RX ORDER — OXYCODONE AND ACETAMINOPHEN 5; 325 MG/1; MG/1
1 TABLET ORAL
Qty: 30 TABLET | Refills: 0 | Status: SHIPPED | OUTPATIENT
Start: 2024-04-15 | End: 2024-05-07

## 2024-05-07 ENCOUNTER — MYC REFILL (OUTPATIENT)
Dept: INTERNAL MEDICINE | Facility: CLINIC | Age: 39
End: 2024-05-07
Payer: MEDICARE

## 2024-05-07 DIAGNOSIS — R52 PAIN: ICD-10-CM

## 2024-05-07 RX ORDER — GABAPENTIN 600 MG/1
600 TABLET ORAL 2 TIMES DAILY
Qty: 60 TABLET | Refills: 0 | Status: SHIPPED | OUTPATIENT
Start: 2024-05-10 | End: 2024-06-07

## 2024-06-07 ENCOUNTER — MYC REFILL (OUTPATIENT)
Dept: INTERNAL MEDICINE | Facility: CLINIC | Age: 39
End: 2024-06-07
Payer: MEDICARE

## 2024-06-07 DIAGNOSIS — R52 PAIN: ICD-10-CM

## 2024-06-07 RX ORDER — GABAPENTIN 600 MG/1
600 TABLET ORAL 2 TIMES DAILY
Qty: 180 TABLET | Refills: 0 | Status: SHIPPED | OUTPATIENT
Start: 2024-06-07 | End: 2024-09-02

## 2024-06-07 NOTE — TELEPHONE ENCOUNTER
Gabapentin : last filled on 5/10/24 with 30 day supply per  data.    Last OV 10/22/22.  Future appt on 8/26/24

## 2024-06-11 ENCOUNTER — MYC REFILL (OUTPATIENT)
Dept: INTERNAL MEDICINE | Facility: CLINIC | Age: 39
End: 2024-06-11
Payer: MEDICARE

## 2024-06-11 DIAGNOSIS — R52 PAIN: ICD-10-CM

## 2024-06-11 DIAGNOSIS — N52.9 MALE ERECTILE DISORDER: ICD-10-CM

## 2024-06-13 RX ORDER — OXYCODONE AND ACETAMINOPHEN 5; 325 MG/1; MG/1
1 TABLET ORAL
Qty: 30 TABLET | Refills: 0 | Status: SHIPPED | OUTPATIENT
Start: 2024-06-13 | End: 2024-07-10

## 2024-06-13 RX ORDER — SILDENAFIL CITRATE 20 MG/1
20 TABLET ORAL DAILY PRN
Qty: 10 TABLET | Refills: 3 | Status: SHIPPED | OUTPATIENT
Start: 2024-06-13

## 2024-07-10 ENCOUNTER — MYC REFILL (OUTPATIENT)
Dept: INTERNAL MEDICINE | Facility: CLINIC | Age: 39
End: 2024-07-10
Payer: MEDICARE

## 2024-07-10 DIAGNOSIS — R52 PAIN: ICD-10-CM

## 2024-07-10 RX ORDER — OXYCODONE AND ACETAMINOPHEN 5; 325 MG/1; MG/1
1 TABLET ORAL
Qty: 30 TABLET | Refills: 0 | Status: SHIPPED | OUTPATIENT
Start: 2024-07-13 | End: 2024-08-12

## 2024-08-12 ENCOUNTER — MYC REFILL (OUTPATIENT)
Dept: INTERNAL MEDICINE | Facility: CLINIC | Age: 39
End: 2024-08-12
Payer: MEDICARE

## 2024-08-12 DIAGNOSIS — R52 PAIN: ICD-10-CM

## 2024-08-13 RX ORDER — OXYCODONE AND ACETAMINOPHEN 5; 325 MG/1; MG/1
1 TABLET ORAL
Qty: 30 TABLET | Refills: 0 | Status: SHIPPED | OUTPATIENT
Start: 2024-08-13

## 2024-08-26 ENCOUNTER — OFFICE VISIT (OUTPATIENT)
Dept: INTERNAL MEDICINE | Facility: CLINIC | Age: 39
End: 2024-08-26
Payer: MEDICARE

## 2024-08-26 ENCOUNTER — LAB (OUTPATIENT)
Dept: LAB | Facility: CLINIC | Age: 39
End: 2024-08-26
Payer: MEDICARE

## 2024-08-26 VITALS
BODY MASS INDEX: 24.04 KG/M2 | SYSTOLIC BLOOD PRESSURE: 125 MMHG | WEIGHT: 177.3 LBS | DIASTOLIC BLOOD PRESSURE: 72 MMHG | HEART RATE: 82 BPM | OXYGEN SATURATION: 95 % | TEMPERATURE: 97.9 F

## 2024-08-26 DIAGNOSIS — Z13.220 SCREENING CHOLESTEROL LEVEL: Primary | ICD-10-CM

## 2024-08-26 DIAGNOSIS — R52 PAIN: ICD-10-CM

## 2024-08-26 DIAGNOSIS — H93.19 TINNITUS, UNSPECIFIED LATERALITY: ICD-10-CM

## 2024-08-26 DIAGNOSIS — Z13.220 SCREENING CHOLESTEROL LEVEL: ICD-10-CM

## 2024-08-26 LAB
ALBUMIN SERPL BCG-MCNC: 4.3 G/DL (ref 3.5–5.2)
ALP SERPL-CCNC: 56 U/L (ref 40–150)
ALT SERPL W P-5'-P-CCNC: 17 U/L (ref 0–70)
ANION GAP SERPL CALCULATED.3IONS-SCNC: 9 MMOL/L (ref 7–15)
AST SERPL W P-5'-P-CCNC: 20 U/L (ref 0–45)
BASOPHILS # BLD AUTO: 0 10E3/UL (ref 0–0.2)
BASOPHILS NFR BLD AUTO: 1 %
BILIRUB SERPL-MCNC: 0.4 MG/DL
BUN SERPL-MCNC: 17.3 MG/DL (ref 6–20)
CALCIUM SERPL-MCNC: 9 MG/DL (ref 8.8–10.4)
CHLORIDE SERPL-SCNC: 104 MMOL/L (ref 98–107)
CHOLEST SERPL-MCNC: 188 MG/DL
CREAT SERPL-MCNC: 0.77 MG/DL (ref 0.67–1.17)
EGFRCR SERPLBLD CKD-EPI 2021: >90 ML/MIN/1.73M2
EOSINOPHIL # BLD AUTO: 0.1 10E3/UL (ref 0–0.7)
EOSINOPHIL NFR BLD AUTO: 1 %
ERYTHROCYTE [DISTWIDTH] IN BLOOD BY AUTOMATED COUNT: 12.2 % (ref 10–15)
FASTING STATUS PATIENT QL REPORTED: NO
FASTING STATUS PATIENT QL REPORTED: NO
GLUCOSE SERPL-MCNC: 94 MG/DL (ref 70–99)
HCO3 SERPL-SCNC: 26 MMOL/L (ref 22–29)
HCT VFR BLD AUTO: 39.1 % (ref 40–53)
HDLC SERPL-MCNC: 40 MG/DL
HGB BLD-MCNC: 14.1 G/DL (ref 13.3–17.7)
IMM GRANULOCYTES # BLD: 0 10E3/UL
IMM GRANULOCYTES NFR BLD: 0 %
LDLC SERPL CALC-MCNC: 105 MG/DL
LYMPHOCYTES # BLD AUTO: 2 10E3/UL (ref 0.8–5.3)
LYMPHOCYTES NFR BLD AUTO: 30 %
MCH RBC QN AUTO: 32.8 PG (ref 26.5–33)
MCHC RBC AUTO-ENTMCNC: 36.1 G/DL (ref 31.5–36.5)
MCV RBC AUTO: 91 FL (ref 78–100)
MONOCYTES # BLD AUTO: 0.5 10E3/UL (ref 0–1.3)
MONOCYTES NFR BLD AUTO: 7 %
NEUTROPHILS # BLD AUTO: 4.1 10E3/UL (ref 1.6–8.3)
NEUTROPHILS NFR BLD AUTO: 61 %
NONHDLC SERPL-MCNC: 148 MG/DL
NRBC # BLD AUTO: 0 10E3/UL
NRBC BLD AUTO-RTO: 0 /100
PLATELET # BLD AUTO: 380 10E3/UL (ref 150–450)
POTASSIUM SERPL-SCNC: 4.5 MMOL/L (ref 3.4–5.3)
PROT SERPL-MCNC: 7.3 G/DL (ref 6.4–8.3)
RBC # BLD AUTO: 4.3 10E6/UL (ref 4.4–5.9)
SODIUM SERPL-SCNC: 139 MMOL/L (ref 135–145)
TRIGL SERPL-MCNC: 217 MG/DL
WBC # BLD AUTO: 6.6 10E3/UL (ref 4–11)

## 2024-08-26 PROCEDURE — 85025 COMPLETE CBC W/AUTO DIFF WBC: CPT | Performed by: PATHOLOGY

## 2024-08-26 PROCEDURE — 80053 COMPREHEN METABOLIC PANEL: CPT | Performed by: PATHOLOGY

## 2024-08-26 PROCEDURE — 80061 LIPID PANEL: CPT | Performed by: PATHOLOGY

## 2024-08-26 PROCEDURE — 99214 OFFICE O/P EST MOD 30 MIN: CPT | Performed by: INTERNAL MEDICINE

## 2024-08-26 PROCEDURE — 36415 COLL VENOUS BLD VENIPUNCTURE: CPT | Performed by: PATHOLOGY

## 2024-08-26 RX ORDER — OXYCODONE AND ACETAMINOPHEN 5; 325 MG/1; MG/1
1 TABLET ORAL 2 TIMES DAILY PRN
Qty: 45 TABLET | Refills: 0 | Status: SHIPPED | OUTPATIENT
Start: 2024-08-26

## 2024-08-26 NOTE — PROGRESS NOTES
Nicholas is a 39 year old, presenting for the following health issues:  Follow Up (Pt here for follow up; would like to discuss pain medications)      8/26/2024    12:20 PM   Additional Questions   Roomed by Sparkle HARVEY     History of Present Illness       Back Pain:  He presents for follow up of back pain. Patient's back pain is a chronic problem.  Location of back pain:  Right lower back, left lower back, right shoulder, right side of waist and left side of waist  Description of back pain: burning, dull ache, fullness, sharp, shooting and stabbing  Back pain spreads: right buttocks and left buttocks    Since patient first noticed back pain, pain is: always present, but gets better and worse  Does back pain interfere with his job:  Not applicable       He eats 2-3 servings of fruits and vegetables daily.He consumes 0 sweetened beverage(s) daily.He exercises with enough effort to increase his heart rate 60 or more minutes per day.  He exercises with enough effort to increase his heart rate 4 days per week.   He is taking medications regularly.

## 2024-09-02 ENCOUNTER — MYC REFILL (OUTPATIENT)
Dept: INTERNAL MEDICINE | Facility: CLINIC | Age: 39
End: 2024-09-02
Payer: MEDICARE

## 2024-09-02 DIAGNOSIS — R52 PAIN: ICD-10-CM

## 2024-09-03 RX ORDER — GABAPENTIN 600 MG/1
600 TABLET ORAL 2 TIMES DAILY
Qty: 180 TABLET | Refills: 0 | Status: SHIPPED | OUTPATIENT
Start: 2024-09-03

## 2024-09-11 ENCOUNTER — MYC REFILL (OUTPATIENT)
Dept: INTERNAL MEDICINE | Facility: CLINIC | Age: 39
End: 2024-09-11
Payer: MEDICARE

## 2024-09-11 DIAGNOSIS — H93.19 TINNITUS, UNSPECIFIED LATERALITY: ICD-10-CM

## 2024-09-11 DIAGNOSIS — R52 PAIN: ICD-10-CM

## 2024-09-11 DIAGNOSIS — Z13.220 SCREENING CHOLESTEROL LEVEL: ICD-10-CM

## 2024-09-12 RX ORDER — OXYCODONE AND ACETAMINOPHEN 5; 325 MG/1; MG/1
1 TABLET ORAL 2 TIMES DAILY PRN
Qty: 45 TABLET | Refills: 0 | OUTPATIENT
Start: 2024-09-12

## 2024-09-16 NOTE — TELEPHONE ENCOUNTER
Pt requesting call back to discuss the medication refill request for oxyCODONE-acetaminophen (PERCOCET) 5-325 MG tablet. Pt stated it still has not been sent to his pharmacy   How Severe Is It?: moderate Is This A New Presentation, Or A Follow-Up?: Follow Up Vitiligo

## 2024-09-30 ENCOUNTER — TELEPHONE (OUTPATIENT)
Dept: OTOLARYNGOLOGY | Facility: CLINIC | Age: 39
End: 2024-09-30
Payer: MEDICARE

## 2024-09-30 NOTE — TELEPHONE ENCOUNTER
Patient confirmed scheduled appointment:  Date: 4/22/25  Time: 1:10  Visit type: New ear  Provider: Jerrica Patel  Location: [facility]  Testing/imaging: ENT Audio prior   Additional notes:

## 2024-10-09 ENCOUNTER — MYC REFILL (OUTPATIENT)
Dept: INTERNAL MEDICINE | Facility: CLINIC | Age: 39
End: 2024-10-09
Payer: MEDICARE

## 2024-10-09 DIAGNOSIS — H93.19 TINNITUS, UNSPECIFIED LATERALITY: ICD-10-CM

## 2024-10-09 DIAGNOSIS — Z13.220 SCREENING CHOLESTEROL LEVEL: ICD-10-CM

## 2024-10-09 DIAGNOSIS — R52 PAIN: ICD-10-CM

## 2024-10-10 RX ORDER — OXYCODONE AND ACETAMINOPHEN 5; 325 MG/1; MG/1
1 TABLET ORAL 2 TIMES DAILY PRN
Qty: 45 TABLET | Refills: 0 | Status: SHIPPED | OUTPATIENT
Start: 2024-10-12 | End: 2024-11-11

## 2024-10-18 ENCOUNTER — DOCUMENTATION ONLY (OUTPATIENT)
Dept: INTERNAL MEDICINE | Facility: CLINIC | Age: 39
End: 2024-10-18
Payer: MEDICARE

## 2024-10-18 NOTE — PROGRESS NOTES
Type of Form Received: [employer] Detailed Rx [id number]    Form Received (Date) 10/18/24   Form Filled out Yes, faxed 11/4/24   Placed in provider folder Yes

## 2024-11-01 ENCOUNTER — TELEPHONE (OUTPATIENT)
Dept: INTERNAL MEDICINE | Facility: CLINIC | Age: 39
End: 2024-11-01
Payer: MEDICARE

## 2024-11-01 NOTE — TELEPHONE ENCOUNTER
[facility] Call Center    Phone Message    May a detailed message be left on voicemail: yes     Reason for Call: Other: Per pt would like if the team can get the [employer] supply forms done asap. Per pt is out of his supplies. Please and thank you!      Action Taken: Message routed to:  [facility] PCC    Travel Screening: Not Applicable     Date of Service:

## 2024-11-04 ENCOUNTER — MEDICAL CORRESPONDENCE (OUTPATIENT)
Dept: HEALTH INFORMATION MANAGEMENT | Facility: CLINIC | Age: 39
End: 2024-11-04
Payer: MEDICARE

## 2024-11-11 ENCOUNTER — MYC REFILL (OUTPATIENT)
Dept: INTERNAL MEDICINE | Facility: CLINIC | Age: 39
End: 2024-11-11
Payer: MEDICARE

## 2024-11-11 DIAGNOSIS — R52 PAIN: ICD-10-CM

## 2024-11-11 DIAGNOSIS — H93.19 TINNITUS, UNSPECIFIED LATERALITY: ICD-10-CM

## 2024-11-11 DIAGNOSIS — Z13.220 SCREENING CHOLESTEROL LEVEL: ICD-10-CM

## 2024-11-12 RX ORDER — OXYCODONE AND ACETAMINOPHEN 5; 325 MG/1; MG/1
1 TABLET ORAL 2 TIMES DAILY PRN
Qty: 45 TABLET | Refills: 0 | Status: SHIPPED | OUTPATIENT
Start: 2024-11-13

## 2024-12-11 ENCOUNTER — MYC REFILL (OUTPATIENT)
Dept: INTERNAL MEDICINE | Facility: CLINIC | Age: 39
End: 2024-12-11
Payer: MEDICARE

## 2024-12-11 DIAGNOSIS — R52 PAIN: ICD-10-CM

## 2024-12-11 DIAGNOSIS — H93.19 TINNITUS, UNSPECIFIED LATERALITY: ICD-10-CM

## 2024-12-11 DIAGNOSIS — Z13.220 SCREENING CHOLESTEROL LEVEL: ICD-10-CM

## 2024-12-11 RX ORDER — OXYCODONE AND ACETAMINOPHEN 5; 325 MG/1; MG/1
1 TABLET ORAL 2 TIMES DAILY PRN
Qty: 60 TABLET | Refills: 0 | Status: SHIPPED | OUTPATIENT
Start: 2024-12-11

## 2024-12-11 NOTE — TELEPHONE ENCOUNTER
Controlled substance refill request notes    Refill request received for: [place]xycodone-Acetaminophen 5-325  [place]  data reviewed 12/11/24:  Medication last refill: 45 tab, 30 day supply, filled/sold to patient on 11/14/2024  Pended order: Oxycodone-Acetaminophen 5-325, 60 tabs, 30 day supply, fill on or after 12/14/2024 (See My Chart encounter from 11/22/2024)    Primary care provider: Los Baird  Last office visit this department: 8/26/2024  Last virtual visit this department: Visit date not found  Next appointment with PCP: None    Refill request forwarded to provider for review.     Argenis FONSECA LPN  [facility]

## 2025-01-13 ENCOUNTER — MYC REFILL (OUTPATIENT)
Dept: INTERNAL MEDICINE | Facility: CLINIC | Age: 40
End: 2025-01-13
Payer: MEDICARE

## 2025-01-13 DIAGNOSIS — Z13.220 SCREENING CHOLESTEROL LEVEL: ICD-10-CM

## 2025-01-13 DIAGNOSIS — R52 PAIN: ICD-10-CM

## 2025-01-13 DIAGNOSIS — H93.19 TINNITUS, UNSPECIFIED LATERALITY: ICD-10-CM

## 2025-01-13 NOTE — TELEPHONE ENCOUNTER
Controlled substance refill request notes    Refill request received for: [place]xycodone-Acetaminophen 5-325  [place]  data reviewed 01/13/25:  Medication last refill: 60 tab, 30 day supply, filled/sold to patient on 12/16/2024  Pended order: Oxycodone-Acetaminophen 5-325, 60 tab, 30 day supply, fill on or after 01/15/2025     Primary care provider: Los Baird  Last office visit this department: 8/26/2024  Last virtual visit this department: Visit date not found  Next appointment with PCP: None    Refill request forwarded to provider for review.     Argenis FONSECA LPN  [facility]

## 2025-01-14 RX ORDER — OXYCODONE AND ACETAMINOPHEN 5; 325 MG/1; MG/1
1 TABLET ORAL 2 TIMES DAILY PRN
Qty: 60 TABLET | Refills: 0 | Status: SHIPPED | OUTPATIENT
Start: 2025-01-15

## 2025-01-22 NOTE — TELEPHONE ENCOUNTER
"  FUTURE VISIT INFORMATION      FUTURE VISIT INFORMATION:  Date: 4/22/25  Time: 1:10 PM  Location: [facility]   REFERRAL INFORMATION:  Referring provider:    Referring providers clinic:    Reason for visit/diagnosis:  ringing in ears confirmed [facility]     RECORDS REQUESTED FROM      Clinic name Comments Records Status Imaging Status   [facility]  8/26/24 TEA- Los Baird MD  Epic            \"Please notify/message CSS if patient completed outside imaging prior to scheduled appointment and/or any outside records that might have been missed at pre visit -Thanks\"  "

## 2025-01-26 ENCOUNTER — HEALTH MAINTENANCE LETTER (OUTPATIENT)
Age: 40
End: 2025-01-26

## 2025-02-11 ENCOUNTER — MYC REFILL (OUTPATIENT)
Dept: INTERNAL MEDICINE | Facility: CLINIC | Age: 40
End: 2025-02-11
Payer: MEDICARE

## 2025-02-11 DIAGNOSIS — H93.19 TINNITUS, UNSPECIFIED LATERALITY: ICD-10-CM

## 2025-02-11 DIAGNOSIS — R52 PAIN: ICD-10-CM

## 2025-02-11 DIAGNOSIS — Z13.220 SCREENING CHOLESTEROL LEVEL: ICD-10-CM

## 2025-02-11 RX ORDER — OXYCODONE AND ACETAMINOPHEN 5; 325 MG/1; MG/1
1 TABLET ORAL 2 TIMES DAILY PRN
Qty: 60 TABLET | Refills: 0 | Status: SHIPPED | OUTPATIENT
Start: 2025-02-14

## 2025-02-11 RX ORDER — GABAPENTIN 600 MG/1
600 TABLET ORAL 2 TIMES DAILY
Qty: 180 TABLET | Refills: 0 | Status: SHIPPED | OUTPATIENT
Start: 2025-03-02

## 2025-02-11 NOTE — TELEPHONE ENCOUNTER
Controlled substance refill request notes    Refill request received for: [place]xy[place]one[place]Acetaminophen 5-325  [place] reviewed 02/11/25:  Medication last refill: 60 tab, 30 day supply, filled/sold to patient on 01/15/2025  Pended order: Oxycodone-Acetaminophen 5-325, 60 tab, 30 day supply, fill on or after 02/14/2025     Refill request received for: Gabapentin 600 Mg Tablet  [place]  data reviewed 02/11/25:  Medication last refill: 180 tab, 90 day supply, filled/sold to patient on 12/02/2024  Pended order: Gabapentin 600 Mg Tablet, 180 tab, 90 day supply, fill on or after 03/02/2025     Primary care provider: Los Baird  Last office visit this department: 8/26/2024  Last virtual visit this department: Visit date not found  Next appointment with PCP: None    Refill request forwarded to provider for review.     Argenis FONSECA LPN  [facility]

## 2025-03-16 ENCOUNTER — MYC REFILL (OUTPATIENT)
Dept: INTERNAL MEDICINE | Facility: CLINIC | Age: 40
End: 2025-03-16
Payer: MEDICARE

## 2025-03-16 DIAGNOSIS — H93.19 TINNITUS, UNSPECIFIED LATERALITY: ICD-10-CM

## 2025-03-16 DIAGNOSIS — R52 PAIN: ICD-10-CM

## 2025-03-16 DIAGNOSIS — Z13.220 SCREENING CHOLESTEROL LEVEL: ICD-10-CM

## 2025-03-17 RX ORDER — OXYCODONE AND ACETAMINOPHEN 5; 325 MG/1; MG/1
1 TABLET ORAL 2 TIMES DAILY PRN
Qty: 60 TABLET | Refills: 0 | Status: SHIPPED | OUTPATIENT
Start: 2025-03-18

## 2025-03-17 NOTE — TELEPHONE ENCOUNTER
Controlled substance refill request notes    Refill request received for: [place]xycodone-Acetaminophen 5-325  [place]  [place] reviewed 03/17/25:  Medication last refill: 60 tab, 30 day supply, filled/sold to patient on 02/16/2025  Pended order: Oxycodone-Acetaminophen 5-325, 60 tab, 30 day supply, fill on or after 03/18/2025     Primary care provider: Los Baird  Last office visit this department: 8/26/2024  Next appointment with PCP:    8/25/2025  1:00 PM P PHYSICAL 30 min [facility] Los Baird MD   Location Instructions:    [facility] ([facility]) [place] area with multiple transportation and parking options. You may wish to review options for  service and self-parking in more detail on the [facility] s website at [email or web address].&nbsp;        Refill request forwarded to provider for review.     Argenis FONSECA LPN  [facility]

## 2025-04-15 DIAGNOSIS — Z01.10 ENCOUNTER FOR HEARING EXAMINATION: Primary | ICD-10-CM

## 2025-04-17 ENCOUNTER — MYC REFILL (OUTPATIENT)
Dept: INTERNAL MEDICINE | Facility: CLINIC | Age: 40
End: 2025-04-17
Payer: MEDICARE

## 2025-04-17 DIAGNOSIS — R52 PAIN: ICD-10-CM

## 2025-04-17 DIAGNOSIS — Z13.220 SCREENING CHOLESTEROL LEVEL: ICD-10-CM

## 2025-04-17 DIAGNOSIS — H93.19 TINNITUS, UNSPECIFIED LATERALITY: ICD-10-CM

## 2025-04-17 RX ORDER — OXYCODONE AND ACETAMINOPHEN 5; 325 MG/1; MG/1
1 TABLET ORAL 2 TIMES DAILY PRN
Qty: 60 TABLET | Refills: 0 | Status: SHIPPED | OUTPATIENT
Start: 2025-04-17

## 2025-04-17 NOTE — TELEPHONE ENCOUNTER
Controlled substance refill request notes    Refill request received for: [place]xycodone-Acetaminophen 5-325  [place] reviewed 04/17/25:  Medication last refill: qty 60, 30 day supply, filled/sold to patient on 03/18/2025  Pended order: Oxycodone-Acetaminophen 5-325, qty 60, 30 day supply, fill on or after 04/17/2025     Primary care provider: Los Baird  Last office visit with this department: 8/26/2024  Next appointment with PCP:    8/25/2025  1:00 PM P PHYSICAL 30 min [facility] Los Baird MD   Location Instructions:    Due to road construction on [address], travel times to this location may be longer than usual. Please plan for extra travel time and check the [place] Department of Transportation [address] [place] website for delay, closure, and detour information.  The [facility] ([facility]) is in a dense urban area with multiple transportation and parking options. You may wish to review options for  service and self-parking in more detail on the [facility] s website at [email or web address].     Refill request forwarded to provider for review.     Argenis FONSECA LPN  [facility]

## 2025-04-22 ENCOUNTER — PRE VISIT (OUTPATIENT)
Dept: OTOLARYNGOLOGY | Facility: CLINIC | Age: 40
End: 2025-04-22

## 2025-04-22 ENCOUNTER — OFFICE VISIT (OUTPATIENT)
Dept: AUDIOLOGY | Facility: CLINIC | Age: 40
End: 2025-04-22
Attending: INTERNAL MEDICINE
Payer: MEDICARE

## 2025-04-22 ENCOUNTER — OFFICE VISIT (OUTPATIENT)
Dept: OTOLARYNGOLOGY | Facility: CLINIC | Age: 40
End: 2025-04-22
Attending: INTERNAL MEDICINE
Payer: MEDICARE

## 2025-04-22 VITALS
HEART RATE: 84 BPM | BODY MASS INDEX: 21.75 KG/M2 | HEIGHT: 72 IN | DIASTOLIC BLOOD PRESSURE: 91 MMHG | SYSTOLIC BLOOD PRESSURE: 125 MMHG | WEIGHT: 160.6 LBS | OXYGEN SATURATION: 96 %

## 2025-04-22 DIAGNOSIS — H93.13 TINNITUS OF BOTH EARS: Primary | ICD-10-CM

## 2025-04-22 DIAGNOSIS — H93.13 TINNITUS, BILATERAL: Primary | ICD-10-CM

## 2025-04-22 DIAGNOSIS — Z01.10 ENCOUNTER FOR HEARING EXAMINATION: ICD-10-CM

## 2025-04-22 PROCEDURE — 3080F DIAST BP >= 90 MM HG: CPT | Performed by: REGISTERED NURSE

## 2025-04-22 PROCEDURE — 3074F SYST BP LT 130 MM HG: CPT | Performed by: REGISTERED NURSE

## 2025-04-22 PROCEDURE — 1125F AMNT PAIN NOTED PAIN PRSNT: CPT | Performed by: REGISTERED NURSE

## 2025-04-22 PROCEDURE — 99203 OFFICE O/P NEW LOW 30 MIN: CPT | Performed by: REGISTERED NURSE

## 2025-04-22 ASSESSMENT — PAIN SCALES - GENERAL: PAINLEVEL_OUTOF10: MILD PAIN (3)

## 2025-04-22 NOTE — LETTER
4/22/2025       RE: Nicholas Benavides  [address]  [place] [address]     Dear Colleague,    Thank you for referring your patient, Nicholas Benavides, to the [facility] [place] [facility]. Please see a copy of my visit note below.      Otolaryngology Clinic  April 22, 2025    Chief Complaint:   Bilateral tinnitus       History of Present Illness:   Nicholas Benavides is a 40 year old male who presents today for evaluation of bilateral tinnitus.  Patient reports that tinnitus started approximately 2 years ago after having COVID.  Reports that tinnitus is a constant tonal noise that is equal in both ears.  Reports that it can vary in intensity and become very loud at times that they have difficulty hearing over the tinnitus.  Patient reports difficulty hearing in background noise.  Cannot tell the difference in hearing between ears.  History of loud noise exposure working for a [employer] service. Patient denies any otalgia, otorrhea, dizziness, facial numbness/weakness, history of frequent ear infections, or ear surgeries.     Past Medical History:  No past medical history on file.    Past Surgical History:  No past surgical history on file.    Medications:  Current Outpatient Medications   Medication Sig Dispense Refill     docusate sodium (ENEMEEZ MINI) 283 MG enema Place 1 enema rectally daily 150 mL 4     gabapentin (NEURONTIN) 600 MG tablet Take 1 tablet (600 mg) by mouth 2 times daily. 180 tablet 0     ibuprofen (ADVIL/MOTRIN) 600 MG tablet        oxyCODONE-acetaminophen (PERCOCET) 5-325 MG tablet Take 1 tablet by mouth 2 times daily as needed for severe pain. 60 tablet 0     sildenafil (REVATIO) 20 MG tablet Take 1 tablet (20 mg) by mouth daily as needed (ed) 10 tablet 3     omeprazole (PRILOSEC) 10 MG DR capsule Take 1 capsule daily to protect stomach (Patient not taking: No sig reported)         Allergies:  No Known  Allergies     Social History:  Social History     Tobacco Use     Smoking status: Former     Smokeless tobacco: Never       ROS: 10 point ROS neg other than the symptoms noted above in the HPI.    Physical Exam:    BP (!) 125/91   Pulse 84   Ht 1.829 m (6')   Wt 72.8 kg (160 lb 9.6 oz)   SpO2 96%   BMI 21.78 kg/m       Constitutional:  The patient was unaccompanied, well-groomed, and in no acute distress.     Skin: Normal:  warm and pink without rash    Neurologic: Alert and oriented x 3.  CN's III-XII within normal limits.  Voice normal.    Psychiatric: The patient's affect was calm, cooperative, and appropriate.     Communication:  Normal; communicates verbally, normal voice quality.    Respiratory: Breathing comfortably without stridor or exertion of accessory muscles.    Ears: Pinnae and tragus non-tender.  EAC's and TM's were clear.        Audiogram: 4/22/2025  - data independently reviewed  Normal sloping to mild sensorineural hearing loss bilaterally  WR right: 100% left: 96% at 50 dB  Acoustic Reflexes: Present in all conditions  Tympanograms: type A bilaterally    Assessment and Plan:  1. Tinnitus, bilateral (Primary)  Patient with 2+ history of bilateral tinnitus.  Reviewed audiogram today with patient which shows bilateral sensorineural hearing loss.  Explained to patient that tinnitus is a central process, meaning that it is a brain generated noise but can commonly be caused by hearing loss.  When people lose hearing, the brain tries to make up for this loss of these frequencies by making its own noise.  Reviewed other factors that can contribute to tinnitus including stress, anxiety, lack of sleep, and neck/muscle tension.      Explained to patient that there is no specific medication or procedure that can eliminate tinnitus, however, there are evidence-based management strategies that can be used to improve symptoms.  Discussed management strategies with patient including tinnitus masking and  cognitive behavioral approaches.  Recommend that, if patient finds quiet environments most bothersome, patient use a noise machine and/or fan as the tinnitus after to distract the brain from wanting to make tinnitus noise.     Patient does have a mild high-frequency sensorineural hearing loss bilaterally that may be contributing to tinnitus symptoms.  Hearing loss is only in the higher frequencies and patient would likely not benefit from hearing aids.  Recommend that patient continue to monitor at this time.  If tinnitus becomes more bothersome, patient could consider a hearing aid trial more for masking then for aiding patient's current hearing loss.    Patient will follow up in 1 to 2 years with repeat audiogram.  Sooner for any new, sudden changes in hearing or worsening of tinnitus symptoms.    Jerrica Patel DNP, APRN, CNP  Otolaryngology  Head & Neck Surgery  [phone]    30 minutes spent by me on the date of the encounter doing chart review, history and exam, documentation and further activities per the note      Again, thank you for allowing me to participate in the care of your patient.      Sincerely,    Cari Patel, NP

## 2025-04-22 NOTE — PROGRESS NOTES
AUDIOLOGY REPORT    SUMMARY: Audiology visit completed. See audiogram for results.      RECOMMENDATIONS: Follow-up with ENT.    Dimas Blankenship, CCC-A  Clinical Audiologist  [place] #[id number]

## 2025-04-28 NOTE — PROGRESS NOTES
Otolaryngology Clinic  April 22, 2025    Chief Complaint:   Bilateral tinnitus       History of Present Illness:   Nicholas Benavides is a 40 year old male who presents today for evaluation of bilateral tinnitus.  Patient reports that tinnitus started approximately 2 years ago after having COVID.  Reports that tinnitus is a constant tonal noise that is equal in both ears.  Reports that it can vary in intensity and become very loud at times that they have difficulty hearing over the tinnitus.  Patient reports difficulty hearing in background noise.  Cannot tell the difference in hearing between ears.  History of loud noise exposure working for a [employer] service. Patient denies any otalgia, otorrhea, dizziness, facial numbness/weakness, history of frequent ear infections, or ear surgeries.     Past Medical History:  No past medical history on file.    Past Surgical History:  No past surgical history on file.    Medications:  Current Outpatient Medications   Medication Sig Dispense Refill    docusate sodium (ENEMEEZ MINI) 283 MG enema Place 1 enema rectally daily 150 mL 4    gabapentin (NEURONTIN) 600 MG tablet Take 1 tablet (600 mg) by mouth 2 times daily. 180 tablet 0    ibuprofen (ADVIL/MOTRIN) 600 MG tablet       oxyCODONE-acetaminophen (PERCOCET) 5-325 MG tablet Take 1 tablet by mouth 2 times daily as needed for severe pain. 60 tablet 0    sildenafil (REVATIO) 20 MG tablet Take 1 tablet (20 mg) by mouth daily as needed (ed) 10 tablet 3    omeprazole (PRILOSEC) 10 MG DR capsule Take 1 capsule daily to protect stomach (Patient not taking: No sig reported)         Allergies:  No Known Allergies     Social History:  Social History     Tobacco Use    Smoking status: Former    Smokeless tobacco: Never       ROS: 10 point ROS neg other than the symptoms noted above in the HPI.    Physical Exam:    BP (!) 125/91   Pulse 84   Ht 1.829 m (6')   Wt 72.8 kg (160 lb 9.6 oz)   SpO2 96%   BMI 21.78 kg/m        Constitutional:  The patient was unaccompanied, well-groomed, and in no acute distress.     Skin: Normal:  warm and pink without rash    Neurologic: Alert and oriented x 3.  CN's III-XII within normal limits.  Voice normal.    Psychiatric: The patient's affect was calm, cooperative, and appropriate.     Communication:  Normal; communicates verbally, normal voice quality.    Respiratory: Breathing comfortably without stridor or exertion of accessory muscles.    Ears: Pinnae and tragus non-tender.  EAC's and TM's were clear.        Audiogram: 4/22/2025  - data independently reviewed  Normal sloping to mild sensorineural hearing loss bilaterally  WR right: 100% left: 96% at 50 dB  Acoustic Reflexes: Present in all conditions  Tympanograms: type A bilaterally    Assessment and Plan:  1. Tinnitus, bilateral (Primary)  Patient with 2+ history of bilateral tinnitus.  Reviewed audiogram today with patient which shows bilateral sensorineural hearing loss.  Explained to patient that tinnitus is a central process, meaning that it is a brain generated noise but can commonly be caused by hearing loss.  When people lose hearing, the brain tries to make up for this loss of these frequencies by making its own noise.  Reviewed other factors that can contribute to tinnitus including stress, anxiety, lack of sleep, and neck/muscle tension.      Explained to patient that there is no specific medication or procedure that can eliminate tinnitus, however, there are evidence-based management strategies that can be used to improve symptoms.  Discussed management strategies with patient including tinnitus masking and cognitive behavioral approaches.  Recommend that, if patient finds quiet environments most bothersome, patient use a noise machine and/or fan as the tinnitus after to distract the brain from wanting to make tinnitus noise.     Patient does have a mild high-frequency sensorineural hearing loss bilaterally that may be  contributing to tinnitus symptoms.  Hearing loss is only in the higher frequencies and patient would likely not benefit from hearing aids.  Recommend that patient continue to monitor at this time.  If tinnitus becomes more bothersome, patient could consider a hearing aid trial more for masking then for aiding patient's current hearing loss.    Patient will follow up in 1 to 2 years with repeat audiogram.  Sooner for any new, sudden changes in hearing or worsening of tinnitus symptoms.    Jerrica Patel DNP, APRN, CNP  Otolaryngology  Head & Neck Surgery  [phone]    30 minutes spent by me on the date of the encounter doing chart review, history and exam, documentation and further activities per the note

## 2025-05-14 ENCOUNTER — MYC REFILL (OUTPATIENT)
Dept: INTERNAL MEDICINE | Facility: CLINIC | Age: 40
End: 2025-05-14
Payer: MEDICARE

## 2025-05-14 DIAGNOSIS — H93.19 TINNITUS, UNSPECIFIED LATERALITY: ICD-10-CM

## 2025-05-14 DIAGNOSIS — Z13.220 SCREENING CHOLESTEROL LEVEL: ICD-10-CM

## 2025-05-14 DIAGNOSIS — R52 PAIN: ICD-10-CM

## 2025-05-14 RX ORDER — OXYCODONE AND ACETAMINOPHEN 5; 325 MG/1; MG/1
1 TABLET ORAL 2 TIMES DAILY PRN
Qty: 60 TABLET | Refills: 0 | Status: SHIPPED | OUTPATIENT
Start: 2025-05-19

## 2025-05-14 NOTE — TELEPHONE ENCOUNTER
Controlled substance refill request notes    Refill request received for: [place]xycod[place]-Acetaminophen 5-325  [place]  data reviewed 05/14/25:  Medication last refill: qty 60, 30 day supply, filled/sold to patient on 04/19/2025  Pended order: Oxycodone-Acetaminophen 5-325, qty 60, 30 day supply, fill on or after 05/19/2025     Primary care provider: Los Baird  Last office visit with this department: 8/26/2024  Next appointment with PCP:   Future Appointments 5/14/2025 - 11/10/2025        Date Visit Type Length Department Provider     8/25/2025  1:00 PM [facility] 30 min [facility] Los Baird MD    Location Instructions:     Due to [place] on [address], travel times to this location may be longer than usual. Please plan for extra travel time and check the [place] [employer] [address] [place] website for delay, closure, and detour information.  The [facility] ([facility]) is in a dense urban area with multiple transportation and parking options. You may wish to review options for  service and self-parking in more detail on the [facility] s website at [email or web address].                     Refill request forwarded to provider for review.     Argenis FONSECA LPN  [facility]

## 2025-05-28 ENCOUNTER — MYC REFILL (OUTPATIENT)
Dept: INTERNAL MEDICINE | Facility: CLINIC | Age: 40
End: 2025-05-28
Payer: MEDICARE

## 2025-05-28 DIAGNOSIS — R52 PAIN: ICD-10-CM

## 2025-05-29 RX ORDER — GABAPENTIN 600 MG/1
600 TABLET ORAL 2 TIMES DAILY
Qty: 180 TABLET | Refills: 0 | Status: SHIPPED | OUTPATIENT
Start: 2025-05-29

## 2025-06-16 ENCOUNTER — MYC REFILL (OUTPATIENT)
Dept: INTERNAL MEDICINE | Facility: CLINIC | Age: 40
End: 2025-06-16
Payer: MEDICARE

## 2025-06-16 DIAGNOSIS — Z13.220 SCREENING CHOLESTEROL LEVEL: ICD-10-CM

## 2025-06-16 DIAGNOSIS — H93.19 TINNITUS, UNSPECIFIED LATERALITY: ICD-10-CM

## 2025-06-16 DIAGNOSIS — R52 PAIN: ICD-10-CM

## 2025-06-16 RX ORDER — OXYCODONE AND ACETAMINOPHEN 5; 325 MG/1; MG/1
1 TABLET ORAL 2 TIMES DAILY PRN
Qty: 60 TABLET | Refills: 0 | Status: SHIPPED | OUTPATIENT
Start: 2025-06-18

## 2025-06-16 NOTE — TELEPHONE ENCOUNTER
Controlled substance refill request notes    Refill request received for: [place]xy[place]Acetaminophen 5-325  [place]  data reviewed 06/16/25:  Medication last refill: qty 60, 30 day supply, filled/sold to patient on 05/19/2025  Pended order: Oxycodone-Acetaminophen 5-325, qty 60, 30 day supply, fill on or after 06/18/2025     Primary care provider: Los Baird  Last office visit with this department: 8/26/2024  Next appointment with PCP:   Future Appointments 6/16/2025 - 12/13/2025        Date Visit Type Length Department Provider     8/25/2025  1:00 PM [facility] 30 min [facility] Los Baird MD    Location Instructions:     Due to road construction on [address], travel times to this location may be longer than usual. Please plan for extra travel time and check the [place] [employer] [address] [place] website for delay, closure, and detour information.  The [facility] ([facility] in a dense urban area with multiple transportation and parking options. You may wish to review options for  service and self-parking in more detail on the [facility] s website at [email or web address].                     Refill request forwarded to provider for review.     Argenis FONSECA LPN  [facility]

## 2025-07-16 ENCOUNTER — MYC REFILL (OUTPATIENT)
Dept: INTERNAL MEDICINE | Facility: CLINIC | Age: 40
End: 2025-07-16
Payer: MEDICARE

## 2025-07-16 DIAGNOSIS — R52 PAIN: ICD-10-CM

## 2025-07-16 DIAGNOSIS — Z13.220 SCREENING CHOLESTEROL LEVEL: ICD-10-CM

## 2025-07-16 DIAGNOSIS — H93.19 TINNITUS, UNSPECIFIED LATERALITY: ICD-10-CM

## 2025-07-16 RX ORDER — OXYCODONE AND ACETAMINOPHEN 5; 325 MG/1; MG/1
1 TABLET ORAL 2 TIMES DAILY PRN
Qty: 60 TABLET | Refills: 0 | Status: SHIPPED | OUTPATIENT
Start: 2025-07-18

## 2025-07-16 NOTE — TELEPHONE ENCOUNTER
Controlled substance refill request notes    Refill request received for: [place]xy[place]-Acetaminophen 5-325b  [place]  data reviewed 07/16/25:  Medication last refill: qty 60, 30 day supply, filled/sold to patient on 06/18/2025  Pended order: Oxycodone-Acetaminophen 5-325, qty 60, 30 day supply, fill on or after 07/18/2025     Primary care provider: Los Baird  Last office visit with this department: 8/26/2024  Next appointment with PCP:   Future Appointments 7/16/2025 - 1/12/2026        Date Visit Type Length Department Provider     8/25/2025  1:00 PM [facility] 30 min [facility] Los Baird MD    Location Instructions:     Due to road [place] on [address], travel times to this location may be longer than usual. Please plan for extra travel time and check the [place] [employer] [address] [place] website for delay, closure, and detour information.  The [facility] ([facility]) is in a dense urban area with multiple transportation and parking options. You may wish to review options for  service and self-parking in more detail on the [facility] s website at [email or web address].                 Refill request forwarded to provider for review.     Argenis FONSECA LPN  [facility]

## 2025-07-17 ENCOUNTER — TELEPHONE (OUTPATIENT)
Dept: INTERNAL MEDICINE | Facility: CLINIC | Age: 40
End: 2025-07-17
Payer: MEDICARE

## 2025-07-17 NOTE — TELEPHONE ENCOUNTER
Called patient- he does self cath 6 times a day, with 16 Macedonian caths 180 a month. He does have bladder and urethral sensation. He is not able to void spontaneously.     He also is wondering if he can have another rx for the enemeez mini enemas. He is not able to have a bm without enemas. He has tried a suppository but these usually take 45 mins longer. His insurance does not cover and he is hoping to get them covered. Has been taking otc fleet enemas.     Dustin Wolfe RN on 7/17/2025 at 4:17 PM

## 2025-07-17 NOTE — TELEPHONE ENCOUNTER
Called and lvm with Amenda at [facility] to see what is needed.    Dustin Wolfe RN on 7/17/2025 at 1:50 PM

## 2025-07-17 NOTE — LETTER
Nicholas Benavides  [facility] [address]  [place] [address]  : 1985  MRN:  [id number]      2025          To whom it may concern,     I am writing to endorse the medical necessity for my patient Nicholas Benavides to receive 180 16 Greek long catheters a month, up to 6 times a day. He has a longstanding history of spinal cord injury with cauda equina syndrome, and he is not able to void spontaneously without self-cathing. He is able to sense when his bladder is full.     Thanks,    Los Baird MD

## 2025-07-18 ENCOUNTER — TELEPHONE (OUTPATIENT)
Dept: INTERNAL MEDICINE | Facility: CLINIC | Age: 40
End: 2025-07-18
Payer: MEDICARE

## 2025-08-13 ENCOUNTER — MYC REFILL (OUTPATIENT)
Dept: INTERNAL MEDICINE | Facility: CLINIC | Age: 40
End: 2025-08-13
Payer: MEDICARE

## 2025-08-13 DIAGNOSIS — H93.19 TINNITUS, UNSPECIFIED LATERALITY: ICD-10-CM

## 2025-08-13 DIAGNOSIS — R52 PAIN: ICD-10-CM

## 2025-08-13 DIAGNOSIS — Z13.220 SCREENING CHOLESTEROL LEVEL: ICD-10-CM

## 2025-08-13 RX ORDER — OXYCODONE AND ACETAMINOPHEN 5; 325 MG/1; MG/1
1 TABLET ORAL 2 TIMES DAILY PRN
Qty: 60 TABLET | Refills: 0 | Status: SHIPPED | OUTPATIENT
Start: 2025-08-17

## 2025-08-22 ENCOUNTER — MYC REFILL (OUTPATIENT)
Dept: INTERNAL MEDICINE | Facility: CLINIC | Age: 40
End: 2025-08-22
Payer: MEDICARE

## 2025-08-22 DIAGNOSIS — R52 PAIN: ICD-10-CM

## 2025-08-22 SDOH — HEALTH STABILITY: PHYSICAL HEALTH: ON AVERAGE, HOW MANY DAYS PER WEEK DO YOU ENGAGE IN MODERATE TO STRENUOUS EXERCISE (LIKE A BRISK WALK)?: 5 DAYS

## 2025-08-22 SDOH — HEALTH STABILITY: PHYSICAL HEALTH: ON AVERAGE, HOW MANY MINUTES DO YOU ENGAGE IN EXERCISE AT THIS LEVEL?: 120 MIN

## 2025-08-25 ENCOUNTER — OFFICE VISIT (OUTPATIENT)
Dept: INTERNAL MEDICINE | Facility: CLINIC | Age: 40
End: 2025-08-25
Payer: MEDICARE

## 2025-08-25 ENCOUNTER — LAB (OUTPATIENT)
Dept: LAB | Facility: CLINIC | Age: 40
End: 2025-08-25
Payer: MEDICARE

## 2025-08-25 VITALS
TEMPERATURE: 98.2 F | WEIGHT: 165.1 LBS | DIASTOLIC BLOOD PRESSURE: 79 MMHG | HEIGHT: 72 IN | SYSTOLIC BLOOD PRESSURE: 126 MMHG | RESPIRATION RATE: 18 BRPM | OXYGEN SATURATION: 95 % | HEART RATE: 72 BPM | BODY MASS INDEX: 22.36 KG/M2

## 2025-08-25 DIAGNOSIS — L90.5 SCAR CONDITION AND FIBROSIS OF SKIN: ICD-10-CM

## 2025-08-25 DIAGNOSIS — Z13.220 SCREENING CHOLESTEROL LEVEL: Primary | ICD-10-CM

## 2025-08-25 DIAGNOSIS — Z13.220 SCREENING CHOLESTEROL LEVEL: ICD-10-CM

## 2025-08-25 LAB
ALBUMIN SERPL BCG-MCNC: 4.5 G/DL (ref 3.5–5.2)
ALP SERPL-CCNC: 57 U/L (ref 40–150)
ALT SERPL W P-5'-P-CCNC: 13 U/L (ref 0–70)
ANION GAP SERPL CALCULATED.3IONS-SCNC: 12 MMOL/L (ref 7–15)
AST SERPL W P-5'-P-CCNC: 20 U/L (ref 0–45)
BASOPHILS # BLD AUTO: 0.04 10E3/UL (ref 0–0.2)
BASOPHILS NFR BLD AUTO: 0.7 %
BILIRUB SERPL-MCNC: 0.3 MG/DL
BUN SERPL-MCNC: 15.5 MG/DL (ref 6–20)
CALCIUM SERPL-MCNC: 9.5 MG/DL (ref 8.8–10.4)
CHLORIDE SERPL-SCNC: 99 MMOL/L (ref 98–107)
CHOLEST SERPL-MCNC: 205 MG/DL
CREAT SERPL-MCNC: 0.81 MG/DL (ref 0.67–1.17)
EGFRCR SERPLBLD CKD-EPI 2021: >90 ML/MIN/1.73M2
EOSINOPHIL # BLD AUTO: 0.05 10E3/UL (ref 0–0.7)
EOSINOPHIL NFR BLD AUTO: 0.8 %
ERYTHROCYTE [DISTWIDTH] IN BLOOD BY AUTOMATED COUNT: 11.9 % (ref 10–15)
FASTING STATUS PATIENT QL REPORTED: NO
FASTING STATUS PATIENT QL REPORTED: NO
GLUCOSE SERPL-MCNC: 102 MG/DL (ref 70–99)
HCO3 SERPL-SCNC: 26 MMOL/L (ref 22–29)
HCT VFR BLD AUTO: 42.3 % (ref 40–53)
HDLC SERPL-MCNC: 48 MG/DL
HGB BLD-MCNC: 14.7 G/DL (ref 13.3–17.7)
IMM GRANULOCYTES # BLD: <0.03 10E3/UL
IMM GRANULOCYTES NFR BLD: 0.2 %
LDLC SERPL CALC-MCNC: 120 MG/DL
LYMPHOCYTES # BLD AUTO: 1.99 10E3/UL (ref 0.8–5.3)
LYMPHOCYTES NFR BLD AUTO: 32.9 %
MCH RBC QN AUTO: 32 PG (ref 26.5–33)
MCHC RBC AUTO-ENTMCNC: 34.8 G/DL (ref 31.5–36.5)
MCV RBC AUTO: 92 FL (ref 78–100)
MONOCYTES # BLD AUTO: 0.43 10E3/UL (ref 0–1.3)
MONOCYTES NFR BLD AUTO: 7.1 %
NEUTROPHILS # BLD AUTO: 3.53 10E3/UL (ref 1.6–8.3)
NEUTROPHILS NFR BLD AUTO: 58.3 %
NONHDLC SERPL-MCNC: 157 MG/DL
NRBC # BLD AUTO: <0.03 10E3/UL
NRBC BLD AUTO-RTO: 0 /100
PLATELET # BLD AUTO: 408 10E3/UL (ref 150–450)
POTASSIUM SERPL-SCNC: 4.4 MMOL/L (ref 3.4–5.3)
PROT SERPL-MCNC: 7.7 G/DL (ref 6.4–8.3)
RBC # BLD AUTO: 4.6 10E6/UL (ref 4.4–5.9)
SODIUM SERPL-SCNC: 137 MMOL/L (ref 135–145)
TRIGL SERPL-MCNC: 187 MG/DL
WBC # BLD AUTO: 6.05 10E3/UL (ref 4–11)

## 2025-08-25 PROCEDURE — 99214 OFFICE O/P EST MOD 30 MIN: CPT | Mod: 25 | Performed by: INTERNAL MEDICINE

## 2025-08-25 PROCEDURE — 80061 LIPID PANEL: CPT | Performed by: PATHOLOGY

## 2025-08-25 PROCEDURE — 91320 SARSCV2 VAC 30MCG TRS-SUC IM: CPT | Performed by: INTERNAL MEDICINE

## 2025-08-25 PROCEDURE — 80053 COMPREHEN METABOLIC PANEL: CPT | Performed by: PATHOLOGY

## 2025-08-25 PROCEDURE — 3078F DIAST BP <80 MM HG: CPT | Performed by: INTERNAL MEDICINE

## 2025-08-25 PROCEDURE — 85025 COMPLETE CBC W/AUTO DIFF WBC: CPT | Performed by: PATHOLOGY

## 2025-08-25 PROCEDURE — 36415 COLL VENOUS BLD VENIPUNCTURE: CPT | Performed by: PATHOLOGY

## 2025-08-25 PROCEDURE — 3049F LDL-C 100-129 MG/DL: CPT | Performed by: INTERNAL MEDICINE

## 2025-08-25 PROCEDURE — 90480 ADMN SARSCOV2 VAC 1/ONLY CMP: CPT | Performed by: INTERNAL MEDICINE

## 2025-08-25 PROCEDURE — 3074F SYST BP LT 130 MM HG: CPT | Performed by: INTERNAL MEDICINE

## 2025-08-25 RX ORDER — GABAPENTIN 600 MG/1
600 TABLET ORAL 2 TIMES DAILY
Qty: 180 TABLET | Refills: 0 | Status: SHIPPED | OUTPATIENT
Start: 2025-08-25

## 2025-08-26 ENCOUNTER — PATIENT OUTREACH (OUTPATIENT)
Dept: CARE COORDINATION | Facility: CLINIC | Age: 40
End: 2025-08-26
Payer: MEDICARE